# Patient Record
Sex: FEMALE | Race: BLACK OR AFRICAN AMERICAN | Employment: OTHER | URBAN - METROPOLITAN AREA
[De-identification: names, ages, dates, MRNs, and addresses within clinical notes are randomized per-mention and may not be internally consistent; named-entity substitution may affect disease eponyms.]

---

## 2019-09-25 ENCOUNTER — HOSPITAL ENCOUNTER (EMERGENCY)
Age: 64
Discharge: HOME | End: 2019-09-25
Attending: EMERGENCY MEDICINE
Payer: MEDICAID

## 2019-09-25 VITALS
RESPIRATION RATE: 18 BRPM | HEIGHT: 63 IN | TEMPERATURE: 97.9 F | DIASTOLIC BLOOD PRESSURE: 72 MMHG | WEIGHT: 180 LBS | BODY MASS INDEX: 31.89 KG/M2 | HEART RATE: 75 BPM | OXYGEN SATURATION: 100 % | SYSTOLIC BLOOD PRESSURE: 135 MMHG

## 2019-09-25 DIAGNOSIS — R10.33 PERIUMBILICAL ABDOMINAL PAIN: Primary | ICD-10-CM

## 2019-09-25 LAB
ABO/RH: NORMAL
ALBUMIN SERPL-MCNC: 3.7 G/DL (ref 3.4–5)
ALP BLD-CCNC: 80 U/L (ref 40–129)
ALT SERPL-CCNC: 24 U/L (ref 10–40)
ANION GAP SERPL CALCULATED.3IONS-SCNC: 7 MMOL/L (ref 3–16)
ANTIBODY SCREEN: NORMAL
AST SERPL-CCNC: 34 U/L (ref 15–37)
BASOPHILS ABSOLUTE: 0 K/UL (ref 0–0.2)
BASOPHILS RELATIVE PERCENT: 0.5 %
BILIRUB SERPL-MCNC: 0.4 MG/DL (ref 0–1)
BILIRUBIN DIRECT: <0.2 MG/DL (ref 0–0.3)
BILIRUBIN, INDIRECT: NORMAL MG/DL (ref 0–1)
BUN BLDV-MCNC: 9 MG/DL (ref 7–20)
CALCIUM SERPL-MCNC: 9.1 MG/DL (ref 8.3–10.6)
CHLORIDE BLD-SCNC: 104 MMOL/L (ref 99–110)
CO2: 31 MMOL/L (ref 21–32)
CREAT SERPL-MCNC: 0.8 MG/DL (ref 0.6–1.2)
EKG ATRIAL RATE: 74 BPM
EKG DIAGNOSIS: NORMAL
EKG P AXIS: 58 DEGREES
EKG P-R INTERVAL: 170 MS
EKG Q-T INTERVAL: 380 MS
EKG QRS DURATION: 84 MS
EKG QTC CALCULATION (BAZETT): 421 MS
EKG R AXIS: 24 DEGREES
EKG T AXIS: 42 DEGREES
EKG VENTRICULAR RATE: 74 BPM
EOSINOPHILS ABSOLUTE: 0.1 K/UL (ref 0–0.6)
EOSINOPHILS RELATIVE PERCENT: 2.2 %
GFR AFRICAN AMERICAN: >60
GFR NON-AFRICAN AMERICAN: >60
GLUCOSE BLD-MCNC: 169 MG/DL (ref 70–99)
HCT VFR BLD CALC: 40.7 % (ref 36–48)
HEMOGLOBIN: 13.8 G/DL (ref 12–16)
LIPASE: 21 U/L (ref 13–60)
LYMPHOCYTES ABSOLUTE: 1.9 K/UL (ref 1–5.1)
LYMPHOCYTES RELATIVE PERCENT: 34.3 %
MCH RBC QN AUTO: 31.7 PG (ref 26–34)
MCHC RBC AUTO-ENTMCNC: 33.8 G/DL (ref 31–36)
MCV RBC AUTO: 93.6 FL (ref 80–100)
MONOCYTES ABSOLUTE: 0.3 K/UL (ref 0–1.3)
MONOCYTES RELATIVE PERCENT: 5.8 %
NEUTROPHILS ABSOLUTE: 3.2 K/UL (ref 1.7–7.7)
NEUTROPHILS RELATIVE PERCENT: 57.2 %
PDW BLD-RTO: 13 % (ref 12.4–15.4)
PLATELET # BLD: 154 K/UL (ref 135–450)
PMV BLD AUTO: 8.7 FL (ref 5–10.5)
POC OCCULT BLOOD STOOL: NEGATIVE
POTASSIUM SERPL-SCNC: 3.6 MMOL/L (ref 3.5–5.1)
PRO-BNP: 66 PG/ML (ref 0–124)
RBC # BLD: 4.35 M/UL (ref 4–5.2)
SODIUM BLD-SCNC: 142 MMOL/L (ref 136–145)
TOTAL PROTEIN: 7.2 G/DL (ref 6.4–8.2)
TROPONIN: <0.01 NG/ML
WBC # BLD: 5.7 K/UL (ref 4–11)

## 2019-09-25 PROCEDURE — 84484 ASSAY OF TROPONIN QUANT: CPT

## 2019-09-25 PROCEDURE — 85025 COMPLETE CBC W/AUTO DIFF WBC: CPT

## 2019-09-25 PROCEDURE — 36415 COLL VENOUS BLD VENIPUNCTURE: CPT

## 2019-09-25 PROCEDURE — 86901 BLOOD TYPING SEROLOGIC RH(D): CPT

## 2019-09-25 PROCEDURE — 99284 EMERGENCY DEPT VISIT MOD MDM: CPT

## 2019-09-25 PROCEDURE — 80076 HEPATIC FUNCTION PANEL: CPT

## 2019-09-25 PROCEDURE — 83880 ASSAY OF NATRIURETIC PEPTIDE: CPT

## 2019-09-25 PROCEDURE — 80048 BASIC METABOLIC PNL TOTAL CA: CPT

## 2019-09-25 PROCEDURE — 86900 BLOOD TYPING SEROLOGIC ABO: CPT

## 2019-09-25 PROCEDURE — 86850 RBC ANTIBODY SCREEN: CPT

## 2019-09-25 PROCEDURE — 83690 ASSAY OF LIPASE: CPT

## 2019-09-25 PROCEDURE — 82272 OCCULT BLD FECES 1-3 TESTS: CPT

## 2019-09-25 PROCEDURE — 93005 ELECTROCARDIOGRAM TRACING: CPT | Performed by: PHYSICIAN ASSISTANT

## 2019-09-25 RX ORDER — OLANZAPINE 10 MG/1
10 TABLET ORAL NIGHTLY
COMMUNITY

## 2019-09-25 ASSESSMENT — PAIN SCALES - GENERAL: PAINLEVEL_OUTOF10: 0

## 2019-09-25 ASSESSMENT — ENCOUNTER SYMPTOMS
ABDOMINAL PAIN: 1
VOMITING: 0
SHORTNESS OF BREATH: 0
NAUSEA: 1
CONSTIPATION: 0
DIARRHEA: 0
CHEST TIGHTNESS: 0

## 2019-10-11 ENCOUNTER — HOSPITAL ENCOUNTER (OUTPATIENT)
Dept: MAMMOGRAPHY | Age: 64
Discharge: HOME | End: 2019-10-16
Payer: MEDICAID

## 2019-10-11 DIAGNOSIS — Z12.31 VISIT FOR SCREENING MAMMOGRAM: ICD-10-CM

## 2019-10-11 PROCEDURE — 77067 SCR MAMMO BI INCL CAD: CPT

## 2021-05-17 ENCOUNTER — HOSPITAL ENCOUNTER (OUTPATIENT)
Dept: MAMMOGRAPHY | Age: 66
Discharge: HOME | End: 2021-05-22
Payer: MEDICAID

## 2021-05-17 VITALS — WEIGHT: 182 LBS | HEIGHT: 63 IN | BODY MASS INDEX: 32.25 KG/M2

## 2021-05-17 DIAGNOSIS — Z12.31 VISIT FOR SCREENING MAMMOGRAM: ICD-10-CM

## 2021-05-17 PROCEDURE — 77067 SCR MAMMO BI INCL CAD: CPT

## 2022-10-10 ENCOUNTER — HOSPITAL ENCOUNTER (EMERGENCY)
Age: 67
Discharge: HOME | End: 2022-10-10
Attending: EMERGENCY MEDICINE
Payer: MEDICARE

## 2022-10-10 ENCOUNTER — APPOINTMENT (OUTPATIENT)
Dept: GENERAL RADIOLOGY | Age: 67
End: 2022-10-10
Payer: MEDICARE

## 2022-10-10 VITALS
HEART RATE: 75 BPM | DIASTOLIC BLOOD PRESSURE: 98 MMHG | BODY MASS INDEX: 31.01 KG/M2 | OXYGEN SATURATION: 100 % | RESPIRATION RATE: 12 BRPM | TEMPERATURE: 99 F | HEIGHT: 63 IN | SYSTOLIC BLOOD PRESSURE: 160 MMHG | WEIGHT: 175 LBS

## 2022-10-10 DIAGNOSIS — S99.921A INJURY OF TOE ON RIGHT FOOT, INITIAL ENCOUNTER: Primary | ICD-10-CM

## 2022-10-10 PROCEDURE — 90471 IMMUNIZATION ADMIN: CPT | Performed by: EMERGENCY MEDICINE

## 2022-10-10 PROCEDURE — 99284 EMERGENCY DEPT VISIT MOD MDM: CPT

## 2022-10-10 PROCEDURE — 73630 X-RAY EXAM OF FOOT: CPT

## 2022-10-10 PROCEDURE — 96372 THER/PROPH/DIAG INJ SC/IM: CPT

## 2022-10-10 PROCEDURE — 6370000000 HC RX 637 (ALT 250 FOR IP): Performed by: EMERGENCY MEDICINE

## 2022-10-10 PROCEDURE — 90715 TDAP VACCINE 7 YRS/> IM: CPT | Performed by: EMERGENCY MEDICINE

## 2022-10-10 PROCEDURE — 6360000002 HC RX W HCPCS: Performed by: EMERGENCY MEDICINE

## 2022-10-10 RX ORDER — ACETAMINOPHEN 325 MG/1
650 TABLET ORAL ONCE
Status: COMPLETED | OUTPATIENT
Start: 2022-10-10 | End: 2022-10-10

## 2022-10-10 RX ORDER — IBUPROFEN 600 MG/1
600 TABLET ORAL 4 TIMES DAILY PRN
Qty: 40 TABLET | Refills: 0 | Status: ON HOLD | OUTPATIENT
Start: 2022-10-10

## 2022-10-10 RX ORDER — ACETAMINOPHEN 500 MG
500 TABLET ORAL 4 TIMES DAILY PRN
Qty: 360 TABLET | Refills: 1 | Status: ON HOLD | OUTPATIENT
Start: 2022-10-10

## 2022-10-10 RX ORDER — KETOROLAC TROMETHAMINE 30 MG/ML
15 INJECTION, SOLUTION INTRAMUSCULAR; INTRAVENOUS ONCE
Status: COMPLETED | OUTPATIENT
Start: 2022-10-10 | End: 2022-10-10

## 2022-10-10 RX ADMIN — ACETAMINOPHEN 650 MG: 325 TABLET ORAL at 08:17

## 2022-10-10 RX ADMIN — KETOROLAC TROMETHAMINE 15 MG: 30 INJECTION, SOLUTION INTRAMUSCULAR at 08:19

## 2022-10-10 RX ADMIN — TETANUS TOXOID, REDUCED DIPHTHERIA TOXOID AND ACELLULAR PERTUSSIS VACCINE, ADSORBED 0.5 ML: 5; 2.5; 8; 8; 2.5 SUSPENSION INTRAMUSCULAR at 08:18

## 2022-10-10 ASSESSMENT — PAIN SCALES - GENERAL
PAINLEVEL_OUTOF10: 2
PAINLEVEL_OUTOF10: 4

## 2022-10-10 ASSESSMENT — PAIN DESCRIPTION - LOCATION
LOCATION: TOE (COMMENT WHICH ONE)
LOCATION: TOE (COMMENT WHICH ONE)

## 2022-10-10 ASSESSMENT — PAIN DESCRIPTION - ORIENTATION
ORIENTATION: LEFT
ORIENTATION: RIGHT

## 2022-10-10 ASSESSMENT — PAIN DESCRIPTION - DESCRIPTORS: DESCRIPTORS: THROBBING

## 2022-10-10 ASSESSMENT — PAIN - FUNCTIONAL ASSESSMENT: PAIN_FUNCTIONAL_ASSESSMENT: 0-10

## 2022-10-10 NOTE — ED PROVIDER NOTES
1 [facility]  EMERGENCY DEPARTMENT ENCOUNTER          ATTENDING PHYSICIAN NOTE       Date of evaluation: 10/10/2022    Chief Complaint     Fall      History of Present Illness     Gilberto Braga is a 78 y.o. female who presents to the emergency department for right great toe pain. Patient says that she was surprised when her daughter came home this morning at 5 AM, knocked on the door, she stood up and started walking towards the door when she caught the toe on the floor and bent it underneath her foot as she was walking. She heard a snap and has had pain since then. She is able to bear weight and ambulate without the need of assistance. She denies falling, additional injuries, pain other than in the toe itself, and all other complaints. Review of Systems     Review of Systems    Pertinent positive and negative findings as documented in the HPI, otherwise other systems were reviewed and were negative. Past Medical, Surgical, Family, and Social History     She has a past medical history of Diabetes mellitus ([facility] [address] Hypertension, and Stroke ([facility] [address] She has no past surgical history on file. Her family history is not on file. She reports that she has been smoking cigarettes. She has a 20.00 pack-year smoking history. She has never used smokeless tobacco. She reports current alcohol use. She reports that she does not use drugs. Medications     Previous Medications    DIPHENHYDRAMINE (SOMINEX) 25 MG TABLET    Take 25 mg by mouth nightly as needed for Sleep. METFORMIN (GLUCOPHAGE) 500 MG TABLET    Take 500 mg by mouth 2 times daily (with meals). NAPROXEN SODIUM (ANAPROX DS) 550 MG TABLET    Take 1 tablet by mouth 2 times daily (with meals) for 14 doses. OLANZAPINE (ZYPREXA) 10 MG TABLET    Take 10 mg by mouth nightly       Allergies     She is allergic to codeine.     Physical Exam     INITIAL VITALS: BP: (!) 164/94, Temp: 99 °F (37.2 °C), Heart Rate: 78, Resp: 18, SpO2: 100 %   Physical Exam    General: Well developed and well nourished. No acute distress. HEENT: NCAT, PERRL, moist mucous membranes  Neck: Trachea midline, neck supple with FROM  MSK: Tenderness at the proximal first phalanx of the right foot, no deformity, no swelling. Superficial laceration at the base of the nail, however toenail remains intact with no obvious avulsion. No tenderness along the metatarsals, ankle, or knee along the right lower extremity. Extremities: No cyanosis or edema. Peripheral pulses intact. Skin: No rashes, abrasions, contusions, or lacerations noted. Neuro: Alert and oriented, moves all extremities spontaneously. No gross motor or sensory deficits. Psych: Mood and affect appropriate. Thought process and content normal.    Diagnostic Results     EKG   None    RADIOLOGY:  XR FOOT RIGHT (MIN 3 VIEWS)   Final Result   1. No acute fracture or dislocation. LABS:   No results found for this visit on 10/10/22. ED BEDSIDE ULTRASOUND:  No results found. RECENT VITALS:  BP: 136/87,Temp: 99 °F (37.2 °C), Heart Rate: 80, Resp: 18, SpO2: 99 %     Procedures     None    ED Course     Nursing Notes, Past Medical Hx, Past Surgical Hx, Social Hx,Allergies, and Family Hx were reviewed.     ED Course as of 10/10/22 0940   Mon Oct 10, 2022   0811 XR FOOT RIGHT (MIN 3 VIEWS) [JH]      ED Course User Index  [JH] Umer Cruz MD       patient was given the following medications:  Orders Placed This Encounter   Medications    Tetanus-Diphth-Acell Pertussis (BOOSTRIX) injection 0.5 mL    ketorolac (TORADOL) injection 15 mg    acetaminophen (TYLENOL) tablet 650 mg    acetaminophen (TYLENOL) 500 MG tablet     Sig: Take 1 tablet by mouth 4 times daily as needed for Pain     Dispense:  360 tablet     Refill:  1    ibuprofen (ADVIL;MOTRIN) 600 MG tablet     Sig: Take 1 tablet by mouth 4 times daily as needed for Pain     Dispense:  40 tablet     Refill:  0       CONSULTS:  None    MEDICAL DECISIONMAKING / ASSESSMENT / SOWMYA Palma is a 78 y.o. female presenting with right great toe pain. On presentation patient was afebrile, hemodynamically stable, no acute distress. Clinically there was minimal concern for a fracture or dislocation as well as minimal concern for a ligamentous injury. Plain films confirmed no osseous injuries. Wound was cleaned and dressed and the patient's tetanus was updated. She was given instructions on wound care and over-the-counter pain control as well as prescriptions for ibuprofen and acetaminophen at her request.    Prior to discharge the patient had some additional questions about medications that she had been prescribed at previous visits where she had been seen for shortness of breath. Specifically wondered if she should continue taking them, but upon serial questioning with questions phrased differently each time she specifically denied the presence of chest pain or dyspnea this morning. Therefore no additional work-up was felt to be indicated at this time. I advised her to continue taking all prescribed medications as directed and to follow-up with her primary care provider. Patient verbalized understanding agreement and was ultimately discharged in stable condition with return precautions. Clinical Impression     1.  Injury of toe on right foot, initial encounter        Disposition     PATIENT REFERRED TO:  Debbie Fagan MD  [address] [place] [address]    Schedule an appointment as soon as possible for a visit   As needed    DISCHARGE MEDICATIONS:  New Prescriptions    ACETAMINOPHEN (TYLENOL) 500 MG TABLET    Take 1 tablet by mouth 4 times daily as needed for Pain    IBUPROFEN (ADVIL;MOTRIN) 600 MG TABLET    Take 1 tablet by mouth 4 times daily as needed for Pain       DISPOSITION Decision To Discharge 10/10/2022 09:34:16 AM          Shonda Wolf MD  10/10/22 8099

## 2022-10-10 NOTE — DISCHARGE INSTRUCTIONS
Your x rays did not show any fracture or dislocation. Your pain is likely musculoskeletal in nature at this time. You should rest the affected area as much as possible, apply ice to the affected area, and take anti-inflammatory medications like Advil or Motrin for your pain. Return to the ED for worsening pain, swelling or redness, or other significant concerns. You may alternate Tylenol and Ibuprofen for pain coverage. Take Ibuprofen 600 mg every 6 hours for your pain. Ibuprofen: Take one 600 mg tablet every 6 hours as needed for pain and inflammation. Take this medicine with food, and be sure to drink plenty of water. Do not take this medicine continuously for longer than two weeks. Ibuprofen is a part of a class of medications called nonsteroidal anti-inflammatory drugs (NSAIDs). NSAIDs cause an increased risk of serious gastrointestinal (GI) adverse events, including bleeding, ulceration, and perforation of the stomach or intestines, which can be fatal. These events can occur at any time during use and without warning symptoms. Elderly patients and patients with a prior history of peptic ulcer disease and/or GI bleeding are at greater risk for serious GI events. Nonsteroidal anti-inflammatory drugs (NSAIDs) cause an increased risk of serious cardiovascular events, including heart attack and stroke, which can be fatal. This risk may occur early in treatment and may increase with duration of use. Use with caution. 3 hours after taking the Ibuprofen, alternate with Tylenol 500 mg. Tylenol 500mg   Take 1 tablet by mouth every 8 hours as needed for pain. Tylenol is highly effective when combined with an anti-inflammatory (NSAID) drug such as: ibuprofen (Advil, Aleve, Motrin, Naproxen). If you were prescribed both medications, take them together. They work better when taken together vs one or the other. Tylenol is in other pain medications, such as Vicodin and Percocet.  Do not take more than 3,000mg (6 tablets) of Tylenol in one day. Do not consume more than 3 alcoholic beverages while taking Tylenol. Do not take Tylenol if you have liver disease.

## 2022-10-10 NOTE — ED TRIAGE NOTES
Pt states that she stubbled today and bent her toe back and may have broken her big toe on the R foot.

## 2022-11-03 ENCOUNTER — HOSPITAL ENCOUNTER (INPATIENT)
Age: 67
LOS: 2 days | Discharge: HOME | DRG: 378 | End: 2022-11-06
Attending: EMERGENCY MEDICINE | Admitting: INTERNAL MEDICINE
Payer: MEDICARE

## 2022-11-03 DIAGNOSIS — K92.2 LOWER GI BLEED: Primary | ICD-10-CM

## 2022-11-03 DIAGNOSIS — K92.2 GASTROINTESTINAL HEMORRHAGE, UNSPECIFIED GASTROINTESTINAL HEMORRHAGE TYPE: ICD-10-CM

## 2022-11-03 PROCEDURE — 85025 COMPLETE CBC W/AUTO DIFF WBC: CPT

## 2022-11-03 PROCEDURE — 36415 COLL VENOUS BLD VENIPUNCTURE: CPT

## 2022-11-03 PROCEDURE — 86923 COMPATIBILITY TEST ELECTRIC: CPT

## 2022-11-03 PROCEDURE — 85730 THROMBOPLASTIN TIME PARTIAL: CPT

## 2022-11-03 PROCEDURE — 99285 EMERGENCY DEPT VISIT HI MDM: CPT

## 2022-11-03 PROCEDURE — 86901 BLOOD TYPING SEROLOGIC RH(D): CPT

## 2022-11-03 PROCEDURE — 86900 BLOOD TYPING SEROLOGIC ABO: CPT

## 2022-11-03 PROCEDURE — 86850 RBC ANTIBODY SCREEN: CPT

## 2022-11-03 PROCEDURE — P9016 RBC LEUKOCYTES REDUCED: HCPCS

## 2022-11-03 PROCEDURE — 86920 COMPATIBILITY TEST SPIN: CPT

## 2022-11-03 PROCEDURE — 80048 BASIC METABOLIC PNL TOTAL CA: CPT

## 2022-11-03 PROCEDURE — 85610 PROTHROMBIN TIME: CPT

## 2022-11-03 RX ORDER — TRANEXAMIC ACID 100 MG/ML
1000 INJECTION, SOLUTION INTRAVENOUS ONCE
Status: DISCONTINUED | OUTPATIENT
Start: 2022-11-03 | End: 2022-11-03

## 2022-11-03 RX ORDER — PANTOPRAZOLE SODIUM 40 MG/10ML
40 INJECTION, POWDER, LYOPHILIZED, FOR SOLUTION INTRAVENOUS ONCE
Status: COMPLETED | OUTPATIENT
Start: 2022-11-03 | End: 2022-11-04

## 2022-11-03 ASSESSMENT — PAIN DESCRIPTION - LOCATION: LOCATION: ABDOMEN

## 2022-11-03 ASSESSMENT — PAIN - FUNCTIONAL ASSESSMENT: PAIN_FUNCTIONAL_ASSESSMENT: 0-10

## 2022-11-04 ENCOUNTER — APPOINTMENT (OUTPATIENT)
Dept: ULTRASOUND IMAGING | Age: 67
DRG: 378 | End: 2022-11-04
Payer: MEDICARE

## 2022-11-04 ENCOUNTER — APPOINTMENT (OUTPATIENT)
Dept: CT IMAGING | Age: 67
DRG: 378 | End: 2022-11-04
Payer: MEDICARE

## 2022-11-04 PROBLEM — K92.2 ACUTE GI BLEEDING: Status: ACTIVE | Noted: 2022-11-04

## 2022-11-04 LAB
ABO/RH: NORMAL
ALBUMIN SERPL-MCNC: 3.6 G/DL (ref 3.4–5)
ALP BLD-CCNC: 65 U/L (ref 40–129)
ALT SERPL-CCNC: 27 U/L (ref 10–40)
ANION GAP SERPL CALCULATED.3IONS-SCNC: 7 MMOL/L (ref 3–16)
ANION GAP SERPL CALCULATED.3IONS-SCNC: 8 MMOL/L (ref 3–16)
ANTIBODY SCREEN: NORMAL
APTT: 26.6 SEC (ref 23–34.3)
AST SERPL-CCNC: 43 U/L (ref 15–37)
BASOPHILS ABSOLUTE: 0 K/UL (ref 0–0.2)
BASOPHILS ABSOLUTE: 0.1 K/UL (ref 0–0.2)
BASOPHILS RELATIVE PERCENT: 0.4 %
BASOPHILS RELATIVE PERCENT: 0.7 %
BILIRUB SERPL-MCNC: 0.5 MG/DL (ref 0–1)
BILIRUBIN DIRECT: <0.2 MG/DL (ref 0–0.3)
BILIRUBIN, INDIRECT: ABNORMAL MG/DL (ref 0–1)
BLOOD BANK DISPENSE STATUS: NORMAL
BLOOD BANK DISPENSE STATUS: NORMAL
BLOOD BANK PRODUCT CODE: NORMAL
BLOOD BANK PRODUCT CODE: NORMAL
BPU ID: NORMAL
BPU ID: NORMAL
BUN BLDV-MCNC: 23 MG/DL (ref 7–20)
BUN BLDV-MCNC: 25 MG/DL (ref 7–20)
CALCIUM SERPL-MCNC: 8.8 MG/DL (ref 8.3–10.6)
CALCIUM SERPL-MCNC: 9.1 MG/DL (ref 8.3–10.6)
CHLORIDE BLD-SCNC: 103 MMOL/L (ref 99–110)
CHLORIDE BLD-SCNC: 105 MMOL/L (ref 99–110)
CO2: 26 MMOL/L (ref 21–32)
CO2: 27 MMOL/L (ref 21–32)
CREAT SERPL-MCNC: 0.7 MG/DL (ref 0.6–1.2)
CREAT SERPL-MCNC: 0.8 MG/DL (ref 0.6–1.2)
DESCRIPTION BLOOD BANK: NORMAL
DESCRIPTION BLOOD BANK: NORMAL
EOSINOPHILS ABSOLUTE: 0.1 K/UL (ref 0–0.6)
EOSINOPHILS ABSOLUTE: 0.2 K/UL (ref 0–0.6)
EOSINOPHILS RELATIVE PERCENT: 0.7 %
EOSINOPHILS RELATIVE PERCENT: 1.8 %
GFR SERPL CREATININE-BSD FRML MDRD: >60 ML/MIN/{1.73_M2}
GFR SERPL CREATININE-BSD FRML MDRD: >60 ML/MIN/{1.73_M2}
GLUCOSE BLD-MCNC: 112 MG/DL (ref 70–99)
GLUCOSE BLD-MCNC: 113 MG/DL (ref 70–99)
GLUCOSE BLD-MCNC: 116 MG/DL (ref 70–99)
GLUCOSE BLD-MCNC: 136 MG/DL (ref 70–99)
GLUCOSE BLD-MCNC: 137 MG/DL (ref 70–99)
GLUCOSE BLD-MCNC: 208 MG/DL (ref 70–99)
HCT VFR BLD CALC: 30.2 % (ref 36–48)
HCT VFR BLD CALC: 33.9 % (ref 36–48)
HCT VFR BLD CALC: 35.2 % (ref 36–48)
HCT VFR BLD CALC: 35.4 % (ref 36–48)
HEMOGLOBIN: 10.3 G/DL (ref 12–16)
HEMOGLOBIN: 11.5 G/DL (ref 12–16)
HEMOGLOBIN: 12 G/DL (ref 12–16)
HEMOGLOBIN: 12 G/DL (ref 12–16)
INR BLD: 1.07 (ref 0.87–1.14)
LYMPHOCYTES ABSOLUTE: 2.7 K/UL (ref 1–5.1)
LYMPHOCYTES ABSOLUTE: 4 K/UL (ref 1–5.1)
LYMPHOCYTES RELATIVE PERCENT: 21.9 %
LYMPHOCYTES RELATIVE PERCENT: 38.2 %
MCH RBC QN AUTO: 30.8 PG (ref 26–34)
MCH RBC QN AUTO: 31.6 PG (ref 26–34)
MCHC RBC AUTO-ENTMCNC: 33.9 G/DL (ref 31–36)
MCHC RBC AUTO-ENTMCNC: 33.9 G/DL (ref 31–36)
MCV RBC AUTO: 90.8 FL (ref 80–100)
MCV RBC AUTO: 93 FL (ref 80–100)
MONOCYTES ABSOLUTE: 0.5 K/UL (ref 0–1.3)
MONOCYTES ABSOLUTE: 0.6 K/UL (ref 0–1.3)
MONOCYTES RELATIVE PERCENT: 4.5 %
MONOCYTES RELATIVE PERCENT: 5.1 %
NEUTROPHILS ABSOLUTE: 5.7 K/UL (ref 1.7–7.7)
NEUTROPHILS ABSOLUTE: 8.9 K/UL (ref 1.7–7.7)
NEUTROPHILS RELATIVE PERCENT: 54.5 %
NEUTROPHILS RELATIVE PERCENT: 72.2 %
PDW BLD-RTO: 12.9 % (ref 12.4–15.4)
PDW BLD-RTO: 14.8 % (ref 12.4–15.4)
PERFORMED ON: ABNORMAL
PLATELET # BLD: 185 K/UL (ref 135–450)
PLATELET # BLD: 194 K/UL (ref 135–450)
PMV BLD AUTO: 7.9 FL (ref 5–10.5)
PMV BLD AUTO: 8.5 FL (ref 5–10.5)
POTASSIUM REFLEX MAGNESIUM: 4 MMOL/L (ref 3.5–5.1)
POTASSIUM REFLEX MAGNESIUM: 4.3 MMOL/L (ref 3.5–5.1)
PROTHROMBIN TIME: 13.8 SEC (ref 11.7–14.5)
RBC # BLD: 3.64 M/UL (ref 4–5.2)
RBC # BLD: 3.9 M/UL (ref 4–5.2)
SODIUM BLD-SCNC: 137 MMOL/L (ref 136–145)
SODIUM BLD-SCNC: 139 MMOL/L (ref 136–145)
TOTAL PROTEIN: 6.2 G/DL (ref 6.4–8.2)
WBC # BLD: 10.5 K/UL (ref 4–11)
WBC # BLD: 12.3 K/UL (ref 4–11)

## 2022-11-04 PROCEDURE — C9113 INJ PANTOPRAZOLE SODIUM, VIA: HCPCS | Performed by: EMERGENCY MEDICINE

## 2022-11-04 PROCEDURE — 6370000000 HC RX 637 (ALT 250 FOR IP): Performed by: STUDENT IN AN ORGANIZED HEALTH CARE EDUCATION/TRAINING PROGRAM

## 2022-11-04 PROCEDURE — 36415 COLL VENOUS BLD VENIPUNCTURE: CPT

## 2022-11-04 PROCEDURE — 87522 HEPATITIS C REVRS TRNSCRPJ: CPT

## 2022-11-04 PROCEDURE — 6360000004 HC RX CONTRAST MEDICATION: Performed by: EMERGENCY MEDICINE

## 2022-11-04 PROCEDURE — 85014 HEMATOCRIT: CPT

## 2022-11-04 PROCEDURE — 96374 THER/PROPH/DIAG INJ IV PUSH: CPT

## 2022-11-04 PROCEDURE — 6360000002 HC RX W HCPCS: Performed by: STUDENT IN AN ORGANIZED HEALTH CARE EDUCATION/TRAINING PROGRAM

## 2022-11-04 PROCEDURE — 80076 HEPATIC FUNCTION PANEL: CPT

## 2022-11-04 PROCEDURE — C9113 INJ PANTOPRAZOLE SODIUM, VIA: HCPCS | Performed by: STUDENT IN AN ORGANIZED HEALTH CARE EDUCATION/TRAINING PROGRAM

## 2022-11-04 PROCEDURE — 85025 COMPLETE CBC W/AUTO DIFF WBC: CPT

## 2022-11-04 PROCEDURE — 2060000000 HC ICU INTERMEDIATE R&B

## 2022-11-04 PROCEDURE — 6360000002 HC RX W HCPCS: Performed by: EMERGENCY MEDICINE

## 2022-11-04 PROCEDURE — 2580000003 HC RX 258: Performed by: STUDENT IN AN ORGANIZED HEALTH CARE EDUCATION/TRAINING PROGRAM

## 2022-11-04 PROCEDURE — 85018 HEMOGLOBIN: CPT

## 2022-11-04 PROCEDURE — 76705 ECHO EXAM OF ABDOMEN: CPT

## 2022-11-04 PROCEDURE — 6370000000 HC RX 637 (ALT 250 FOR IP): Performed by: INTERNAL MEDICINE

## 2022-11-04 PROCEDURE — 80048 BASIC METABOLIC PNL TOTAL CA: CPT

## 2022-11-04 PROCEDURE — 74174 CTA ABD&PLVS W/CONTRAST: CPT

## 2022-11-04 RX ORDER — SODIUM CHLORIDE 0.9 % (FLUSH) 0.9 %
5-40 SYRINGE (ML) INJECTION EVERY 12 HOURS SCHEDULED
Status: DISCONTINUED | OUTPATIENT
Start: 2022-11-04 | End: 2022-11-06 | Stop reason: HOSPADM

## 2022-11-04 RX ORDER — ACETAMINOPHEN 650 MG/1
650 SUPPOSITORY RECTAL EVERY 6 HOURS PRN
Status: DISCONTINUED | OUTPATIENT
Start: 2022-11-04 | End: 2022-11-06 | Stop reason: HOSPADM

## 2022-11-04 RX ORDER — SODIUM CHLORIDE 9 MG/ML
INJECTION, SOLUTION INTRAVENOUS PRN
Status: DISCONTINUED | OUTPATIENT
Start: 2022-11-04 | End: 2022-11-06 | Stop reason: HOSPADM

## 2022-11-04 RX ORDER — DEXTROSE MONOHYDRATE 100 MG/ML
INJECTION, SOLUTION INTRAVENOUS CONTINUOUS PRN
Status: DISCONTINUED | OUTPATIENT
Start: 2022-11-04 | End: 2022-11-06 | Stop reason: HOSPADM

## 2022-11-04 RX ORDER — SODIUM CHLORIDE 9 MG/ML
INJECTION, SOLUTION INTRAVENOUS CONTINUOUS
Status: ACTIVE | OUTPATIENT
Start: 2022-11-04 | End: 2022-11-05

## 2022-11-04 RX ORDER — SODIUM CHLORIDE 9 MG/ML
INJECTION, SOLUTION INTRAVENOUS CONTINUOUS
Status: DISCONTINUED | OUTPATIENT
Start: 2022-11-04 | End: 2022-11-04

## 2022-11-04 RX ORDER — ACETAMINOPHEN 325 MG/1
650 TABLET ORAL EVERY 6 HOURS PRN
Status: DISCONTINUED | OUTPATIENT
Start: 2022-11-04 | End: 2022-11-06 | Stop reason: HOSPADM

## 2022-11-04 RX ORDER — SODIUM CHLORIDE 0.9 % (FLUSH) 0.9 %
5-40 SYRINGE (ML) INJECTION PRN
Status: DISCONTINUED | OUTPATIENT
Start: 2022-11-04 | End: 2022-11-06 | Stop reason: HOSPADM

## 2022-11-04 RX ORDER — POLYETHYLENE GLYCOL 3350 17 G/17G
17 POWDER, FOR SOLUTION ORAL DAILY PRN
Status: DISCONTINUED | OUTPATIENT
Start: 2022-11-04 | End: 2022-11-06 | Stop reason: HOSPADM

## 2022-11-04 RX ORDER — INSULIN LISPRO 100 [IU]/ML
0-4 INJECTION, SOLUTION INTRAVENOUS; SUBCUTANEOUS NIGHTLY
Status: DISCONTINUED | OUTPATIENT
Start: 2022-11-04 | End: 2022-11-06 | Stop reason: HOSPADM

## 2022-11-04 RX ORDER — ONDANSETRON 2 MG/ML
4 INJECTION INTRAMUSCULAR; INTRAVENOUS EVERY 6 HOURS PRN
Status: DISCONTINUED | OUTPATIENT
Start: 2022-11-04 | End: 2022-11-06 | Stop reason: HOSPADM

## 2022-11-04 RX ORDER — PANTOPRAZOLE SODIUM 40 MG/10ML
40 INJECTION, POWDER, LYOPHILIZED, FOR SOLUTION INTRAVENOUS 2 TIMES DAILY
Status: DISCONTINUED | OUTPATIENT
Start: 2022-11-04 | End: 2022-11-06 | Stop reason: HOSPADM

## 2022-11-04 RX ORDER — OLANZAPINE 5 MG/1
10 TABLET ORAL NIGHTLY
Status: DISCONTINUED | OUTPATIENT
Start: 2022-11-04 | End: 2022-11-06 | Stop reason: HOSPADM

## 2022-11-04 RX ORDER — ONDANSETRON 4 MG/1
4 TABLET, ORALLY DISINTEGRATING ORAL EVERY 8 HOURS PRN
Status: DISCONTINUED | OUTPATIENT
Start: 2022-11-04 | End: 2022-11-06 | Stop reason: HOSPADM

## 2022-11-04 RX ORDER — INSULIN LISPRO 100 [IU]/ML
0-4 INJECTION, SOLUTION INTRAVENOUS; SUBCUTANEOUS
Status: DISCONTINUED | OUTPATIENT
Start: 2022-11-04 | End: 2022-11-06 | Stop reason: HOSPADM

## 2022-11-04 RX ADMIN — PANTOPRAZOLE SODIUM 40 MG: 40 INJECTION, POWDER, LYOPHILIZED, FOR SOLUTION INTRAVENOUS at 06:32

## 2022-11-04 RX ADMIN — PANTOPRAZOLE SODIUM 40 MG: 40 INJECTION, POWDER, LYOPHILIZED, FOR SOLUTION INTRAVENOUS at 20:41

## 2022-11-04 RX ADMIN — SODIUM CHLORIDE, PRESERVATIVE FREE 10 ML: 5 INJECTION INTRAVENOUS at 20:41

## 2022-11-04 RX ADMIN — OLANZAPINE 10 MG: 5 TABLET, FILM COATED ORAL at 20:40

## 2022-11-04 RX ADMIN — BISACODYL 20 MG: 5 TABLET, COATED ORAL at 15:40

## 2022-11-04 RX ADMIN — SODIUM CHLORIDE: 9 INJECTION, SOLUTION INTRAVENOUS at 04:17

## 2022-11-04 RX ADMIN — SODIUM CHLORIDE, PRESERVATIVE FREE 10 ML: 5 INJECTION INTRAVENOUS at 08:48

## 2022-11-04 RX ADMIN — PANTOPRAZOLE SODIUM 40 MG: 40 INJECTION, POWDER, FOR SOLUTION INTRAVENOUS at 00:39

## 2022-11-04 RX ADMIN — POLYETHYLENE GLYCOL-3350 AND ELECTROLYTES 4000 ML: 236; 6.74; 5.86; 2.97; 22.74 POWDER, FOR SOLUTION ORAL at 15:42

## 2022-11-04 RX ADMIN — IOPAMIDOL 75 ML: 755 INJECTION, SOLUTION INTRAVENOUS at 00:50

## 2022-11-04 ASSESSMENT — PAIN SCALES - GENERAL
PAINLEVEL_OUTOF10: 0

## 2022-11-04 ASSESSMENT — PAIN SCALES - WONG BAKER: WONGBAKER_NUMERICALRESPONSE: 0

## 2022-11-04 NOTE — PROGRESS NOTES
4 Eyes Admission Assessment     I agree as the admission nurse that 2 RN's have performed a thorough Head to Toe Skin Assessment on the patient. ALL assessment sites listed below have been assessed on admission. Areas assessed by both nurses:   [x]   Head, Face, and Ears   [x]   Shoulders, Back, and Chest  [x]   Arms, Elbows, and Hands   [x]   Coccyx, Sacrum, and Ischium  [x]   Legs, Feet, and Heels        Does the Patient have Skin Breakdown? No    Scattered scaring BLE and back.      Earl Prevention initiated:  No   Wound Care Orders initiated:  No      [facility] nurse consulted for Pressure Injury (Stage 3,4, Unstageable, DTI, NWPT, and Complex wounds) or Earl score 18 or lower:  No      Nurse 1 eSignature: Electronically signed by Deonna Abdullahi RN on 11/4/22 at 4:52 AM EDT    **SHARE this note so that the co-signing nurse is able to place an eSignature**    Nurse 2 eSignature: Electronically signed by Rakesh Roberts RN on 11/4/22 at 4:55 AM EDT

## 2022-11-04 NOTE — ED NOTES
ED Attending at bedside updating family and pt on POC. Questions and concerns being addressed at this time.      Ramirez Dick RN  11/04/22 6996

## 2022-11-04 NOTE — H&P
Internal Medicine  PGY 1  History & Physical      CC blood in stool    History Obtained From:  patient, electronic medical record    HISTORY OF PRESENT ILLNESS:    81-year-old female with a past medical history of hypertension diabetes, stroke in around 1993 came to [facility] ED due to complaint complaints of blood in stool. Patient reported that she had 3 episodes of blood in stool where she noticed her stool to be dark in color and felt dizzy after the second episode. This was the first time that the patient noticed this. Patient did not report any hematemesis or hemoptysis. Patient reports that she has never had a EGD or colonoscopy done. Patient reports no history of ulcers or H. pylori infection. Did not report nausea vomiting or diarrhea, but did report constipation. Patient did mention that she has been taking ibuprofen 600 mg almost daily for 2 to 3 weeks that she was prescribed for right toe pain. She mentioned she was prescribed ibuprofen 600 mg twice daily if needed but she has not required that often. Patient did report some chest discomfort with associated shortness of breath but she attributed that to her history of smoking, reported no other symptoms, has no other concerns or complaints. In the ED, patient underwent MANDY that showed kj blood and was also given a unit of PRBCs. Past Medical History:        Diagnosis Date    Diabetes mellitus ([facility] [address]     Hypertension     Stroke [facility])        Past Surgical History:    History reviewed. No pertinent surgical history. Surgical History reviewed with patient and negative for recent surgies. Medications Priorto Admission:    Not in a hospital admission. Allergies:  Codeine    Social History:   TOBACCO:   reports that she has been smoking cigarettes. She has a 20.00 pack-year smoking history. She has never used smokeless tobacco.  ETOH:   reports current alcohol use. DRUGS : denies use  Patient currently lives     Family History:   History reviewed. No pertinent family history. Family History reviewed with patient, and does not pertain and non-contributory to the current illness  Review of Systems  Constitutional:  No Fever, No Chills, No Night Sweats  ENT/Mouth:  No Nasal Congestion,  No Hoarseness, No new mouth lesion  Eyes:  No Eye Pain, No Redness, No Discharge  Cardiovascular:  No Chest Pain, No Dyspnea on Exertion, No Palpitations  Respiratory:  No Cough, No Sputum, No Dyspnea  Gastrointestinal:  No Nausea, No Vomiting, No Diarrhea,   Genitourinary: No urinary Frequency, No Hematuria, No Urinary pain  Musculoskeletal:  No worsening Arthralgias, No worsening Myalgias  Skin:  No new Skin Lesions, No new skin   Neuro:  No new weakness, No new numbness. Psych:  No suicial ideation, No Violence ideation    ROS: A 10 point review of systems was conducted, significant findings as noted in HPI. Physical Exam  Constitutional:       General: She is not in acute distress. Appearance: She is not diaphoretic. HENT:      Right Ear: External ear normal.      Left Ear: External ear normal.   Eyes:      General:         Right eye: No discharge. Left eye: No discharge. Extraocular Movements: Extraocular movements intact. Conjunctiva/sclera: Conjunctivae normal.   Cardiovascular:      Rate and Rhythm: Normal rate and regular rhythm. Heart sounds: Normal heart sounds. Pulmonary:      Effort: Pulmonary effort is normal. No respiratory distress. Breath sounds: Normal breath sounds. Abdominal:      General: Bowel sounds are normal.      Tenderness: There is no abdominal tenderness. Musculoskeletal:      Cervical back: Normal range of motion. Right lower leg: No edema. Left lower leg: No edema. Neurological:      Mental Status: She is alert and oriented to person, place, and time.    Psychiatric:         Behavior: Behavior normal.     Physical exam:       Vitals:    11/04/22 0158   BP: 116/73   Pulse: 85   Resp: 20   Temp: SpO2: 100%       DATA:    Labs:  CBC:   Recent Labs     11/03/22 2346   WBC 10.5   HGB 11.5*   HCT 33.9*          BMP:   Recent Labs     11/03/22 2346      K 4.0      CO2 26   BUN 25*   CREATININE 0.8   GLUCOSE 208*     LFT's: No results for input(s): AST, ALT, ALB, BILITOT, ALKPHOS in the last 72 hours. Troponin: No results for input(s): TROPONINI in the last 72 hours. BNP:No results for input(s): BNP in the last 72 hours. ABGs: No results for input(s): PHART, IIL6YXX, PO2ART in the last 72 hours. INR:   Recent Labs     11/03/22 2346   INR 1.07       U/A:No results for input(s): NITRITE, COLORU, PHUR, LABCAST, WBCUA, RBCUA, MUCUS, TRICHOMONAS, YEAST, BACTERIA, CLARITYU, SPECGRAV, LEUKOCYTESUR, UROBILINOGEN, BILIRUBINUR, BLOODU, GLUCOSEU, AMORPHOUS in the last 72 hours. Invalid input(s): Rachana Soto    CTA ABDOMEN PELVIS W WO CONTRAST   Final Result      This exam is severely degraded by motion making it impossible to determine if there is contrast extravasation into the bowel lumen. Evaluation with nuclear medicine tagged red blood cell imaging may be helpful.                   ASSESSMENT AND PLAN:    acute lower GIB  Likely secondary to current cigarette use and recent ibuprofen use, no history of ulcers or H. pylori as per patient  At least 3 episodes of dark appearing stool as per HPI, dizziness, kj blood seen on MANDY in the ED, patient taking 600 mg ibuprofen almost daily for 2 to 3 weeks, current smoker, never had a EGD or colonoscopy done  Hemoglobin 11.5  - N.p.o.  - Normal saline fluids  - GI consult  - No anticoagulation  - Protonix 40 twice daily  - Monitor CBC    Chronic hep C  Untreated  PT, INR, platelets normal, no abdominal tenderness  - Consider LFTs and bilirubin studies    Normocytic anemia  Likely secondary to suspected acute LGIB hemoglobin 11.5, was 13.8 in 9/19  - Monitor CBC     Hypertension  BP controlled at 116/73  - Monitor vitals    DM2  - Medium dose sliding scale insulin    Schizophrenia  - Continue home Zyprexa    discussed with attending physician Dr. Laurita Harvey Status:Full code  FEN: N.p.o.  PPX: SCDs  DISPO: Caitie Major MD PGY1  11/4/2022,  3:24 AM    I saw the patient independently from the resident . I discussed the care with the resident. I personally reviewed the HPI, PH, FH, SH, ROS and medications. I repeated pertinent portions of the examination and reviewed the relevant imaging and laboratory data. I agree with the findings, assessment and plan as documented. addition to: Plan as above patient 79-year-old female admitted for acute GI bleed, GI consulted, Protonix 40 IV twice daily.   Does have history of chronic hep C however no history of cirrhosis, platelet PT/INR normal.  Plan as above

## 2022-11-04 NOTE — ED PROVIDER NOTES
[address] [place]          ATTENDING PHYSICIAN NOTE       Date of evaluation: 11/3/2022    Chief Complaint     Rectal Bleeding (Pt brought in by EMS for sudden onset of dark rectal bleeding at dinner time today. Pt is lethargic, alert and oriented x 4. Per EMS blood pressure was 70/40. Pt c/o left sided abdominal pain that started a few minutes ago. Pt states she is SOB and requesting oxygen therapy )      History of Present Illness     Autumn Mcardle is a 78 y.o. female who presents with a complaint of bright and dark red blood per rectum and lightheadedness. Patient had sudden onset of dark red blood in stool, filling the toilet bowl. Was initially awake and alert became more hypotensive and lethargic in route. Here, she is moaning complaining of lower abdominal pain, but otherwise minimally coherent. She denies any use of anticoagulants or antiplatelet agents states she is prescribed some but does not take them because she does not have any symptoms. It is unclear what they are prescribed for her. Review of Systems     Review of Systems   Unable to perform ROS: Unstable vital signs     Past Medical, Surgical, Family, and Social History     She has a past medical history of Diabetes mellitus ([facility] Hypertension, and Stroke ([facility] She has no past surgical history on file. Her family history is not on file. She reports that she has been smoking cigarettes. She has a 20.00 pack-year smoking history. She has never used smokeless tobacco. She reports current alcohol use. She reports that she does not use drugs.     Medications     Current Discharge Medication List        CONTINUE these medications which have NOT CHANGED    Details   acetaminophen (TYLENOL) 500 MG tablet Take 1 tablet by mouth 4 times daily as needed for Pain  Qty: 360 tablet, Refills: 1      ibuprofen (ADVIL;MOTRIN) 600 MG tablet Take 1 tablet by mouth 4 times daily as needed for Pain  Qty: 40 tablet, Refills: 0      OLANZapine (ZYPREXA) 10 MG tablet Take 10 mg by mouth nightly      metFORMIN (GLUCOPHAGE) 500 MG tablet Take 500 mg by mouth 2 times daily (with meals). diphenhydrAMINE (SOMINEX) 25 MG tablet Take 25 mg by mouth nightly as needed for Sleep.      naproxen sodium (ANAPROX DS) 550 MG tablet Take 1 tablet by mouth 2 times daily (with meals) for 14 doses. Qty: 14 tablet, Refills: 0             Allergies     She is allergic to codeine. Physical Exam     INITIAL VITALS: BP: (!) 72/50, Temp: 97.6 °F (36.4 °C), Heart Rate: 68, Resp: 20, SpO2: 97 %   Physical Exam  Constitutional: This is an ill-appearing elderly female, who is uncomfortable, and moaning    HEENT: NC/AT. PERRL 4-2 bilaterally. EOMI. CV:Heart is regular rate and rhythm without murmurs, rubs or gallops. Resp: Respirations unlabored. Lungs clear to auscultation w/o wheezing. Abd: Soft, tender to palpation bilateral lower quadrants. : She has dark blood visible on her buttocks and intergluteal crease, and dried blood visible on her undergarments. No melena noted. No external hemorrhoids appreciated. MSK: Full ROM, no edema or tenderness to palpation. Skin: Extremities are warm and well perfused. 2+ radial pulses . Cap Refill <3 seconds. Neuro: Oriented to name, but otherwise disoriented. Cranial nerves grossly intact   Strength and sensation intact x4 extremities. Psych: Sleepy and somnolent, does not appear to attend to external stimuli. No agitation. Diagnostic Results         RADIOLOGY:  CTA ABDOMEN PELVIS W WO CONTRAST   Final Result      This exam is severely degraded by motion making it impossible to determine if there is contrast extravasation into the bowel lumen. Evaluation with nuclear medicine tagged red blood cell imaging may be helpful.                 LABS:   Results for orders placed or performed during the hospital encounter of 11/03/22   CBC with Auto Differential   Result Value Ref Range    WBC 10.5 4.0 - 11.0 K/uL    RBC 3.64 (L) 4.00 - 5.20 M/uL    Hemoglobin 11.5 (L) 12.0 - 16.0 g/dL    Hematocrit 33.9 (L) 36.0 - 48.0 %    MCV 93.0 80.0 - 100.0 fL    MCH 31.6 26.0 - 34.0 pg    MCHC 33.9 31.0 - 36.0 g/dL    RDW 12.9 12.4 - 15.4 %    Platelets 853 936 - 141 K/uL    MPV 7.9 5.0 - 10.5 fL    Neutrophils % 54.5 %    Lymphocytes % 38.2 %    Monocytes % 5.1 %    Eosinophils % 1.8 %    Basophils % 0.4 %    Neutrophils Absolute 5.7 1.7 - 7.7 K/uL    Lymphocytes Absolute 4.0 1.0 - 5.1 K/uL    Monocytes Absolute 0.5 0.0 - 1.3 K/uL    Eosinophils Absolute 0.2 0.0 - 0.6 K/uL    Basophils Absolute 0.0 0.0 - 0.2 K/uL   Basic Metabolic Panel w/ Reflex to MG   Result Value Ref Range    Sodium 137 136 - 145 mmol/L    Potassium reflex Magnesium 4.0 3.5 - 5.1 mmol/L    Chloride 103 99 - 110 mmol/L    CO2 26 21 - 32 mmol/L    Anion Gap 8 3 - 16    Glucose 208 (H) 70 - 99 mg/dL    BUN 25 (H) 7 - 20 mg/dL    Creatinine 0.8 0.6 - 1.2 mg/dL    Est, Glom Filt Rate >60 >60    Calcium 8.8 8.3 - 10.6 mg/dL   Protime-INR   Result Value Ref Range    Protime 13.8 11.7 - 14.5 sec    INR 1.07 0.87 - 1.14   PTT   Result Value Ref Range    aPTT 26.6 23.0 - 34.3 sec   TYPE AND SCREEN   Result Value Ref Range    ABO/Rh O POS     Antibody Screen NEG    PREPARE RBC (CROSSMATCH)   Result Value Ref Range    Product Code Blood Bank [id number]     Description Blood Bank Red Blood Cells, Apheresis, Leuko-reduced     Unit Number [id number]     Dispense Status Blood Bank selected     Product Code Blood Bank [id number]     Description Blood Bank Red Blood Cells, Leuko-reduced     Unit Number [id number]     Dispense Status Blood Bank issued        ED BEDSIDE ULTRASOUND:  No results found. RECENT VITALS:  BP: 107/73,Temp: 98.5 °F (36.9 °C), Heart Rate: 86, Resp: 18, SpO2: 98 %     Procedures         ED Course     Nursing Notes, Past Medical Hx, Past Surgical Hx, Social Hx,Allergies, and Family Hx were reviewed.          patient was given the following medications:  Orders Placed This Encounter   Medications    pantoprazole (PROTONIX) injection 40 mg    DISCONTD: tranexamic acid (CYKLOKAPRON) injection 1,000 mg    iopamidol (ISOVUE-370) 76 % injection 75 mL    sodium chloride flush 0.9 % injection 5-40 mL    sodium chloride flush 0.9 % injection 5-40 mL    0.9 % sodium chloride infusion    OR Linked Order Group     ondansetron (ZOFRAN-ODT) disintegrating tablet 4 mg     ondansetron (ZOFRAN) injection 4 mg    polyethylene glycol (GLYCOLAX) packet 17 g    OR Linked Order Group     acetaminophen (TYLENOL) tablet 650 mg     acetaminophen (TYLENOL) suppository 650 mg    OLANZapine (ZYPREXA) tablet 10 mg    pantoprazole (PROTONIX) injection 40 mg    DISCONTD: 0.9 % sodium chloride infusion    glucose chewable tablet 16 g    OR Linked Order Group     dextrose bolus 10% 125 mL     dextrose bolus 10% 250 mL    glucagon (rDNA) injection 1 mg    dextrose 10 % infusion    insulin lispro (1 Unit Dial) (HUMALOG/ADMELOG) pen 0-4 Units    insulin lispro (1 Unit Dial) (HUMALOG/ADMELOG) pen 0-4 Units    0.9 % sodium chloride infusion       CONSULTS:  IP CONSULT TO GI  IP CONSULT TO HOSPITALIST    MEDICAL DECISIONMAKING / ASSESSMENT / Riya Kulwinder is a 78 y.o. female presenting with a complaint of bright and dark red blood per rectum and hypotension. On arrival, 2 IV access points were established and fluids from EMS were finished for we are awaiting emergent blood. Her ongoing hemodynamic instability dictated the need for emergent uncrossed matched blood. 1 unit of O- blood was given to the patient with subsequent improvement in her hemodynamics. Her hemoglobin returned at 11.5, and she had no further hypotension or acute bleeding noted here. CTA of her abdomen was motion limited but did not show any active extravasation. The patient regained mental status, had some mild dementia but was otherwise alert oriented and appropriate.   She and her daughter do not recall any history of prior lower GI bleeding or any recent colonoscopy and there was none noted in the chart. I discussed the case with Dr. Perla Velasco on-call for GI, who requested she be made n.p.o. She will be admitted to the hospitalist service, for serial hemoglobins, hemodynamic monitoring, and GI evaluation in the morning. .    Critical Care:  Due to the immediate potential for life-threatening deterioration due to acute blood loss anemia and hypovolemic shock, I spent 40 minutes providing critical care. This time excludes time spent performing procedures but includes time spent on direct patient care, history retrieval, review of the chart, and discussions with patient, family, and consultant(s). Clinical Impression     1. Lower GI bleed        Disposition     PATIENT REFERRED TO:  No follow-up provider specified.     DISCHARGE MEDICATIONS:  Current Discharge Medication List          DISPOSITION Admitted 11/04/2022 02:20:54 AM          Víctor Jean Baptiste MD  11/04/22 8857

## 2022-11-04 NOTE — ED TRIAGE NOTES
Pt brought in by EMS for sudden onset of dark rectal bleeding at dinner time today. Pt is lethargic, alert and oriented x 4. Per EMS blood pressure was 70/40. Pt c/o left sided abdominal pain that started a few minutes ago.  Pt states she is SOB and requesting oxygen therapy

## 2022-11-04 NOTE — PROGRESS NOTES
Internal Medicine Progress Note    Patient Name: Araceli Chand   Admit Date: 11/3/2022   Code:Full Code  PCP: Celestine Sinclair MD     CC: Rectal Bleeding (Pt brought in by EMS for sudden onset of dark rectal bleeding at dinner time today. Pt is lethargic, alert and oriented x 4. Per EMS blood pressure was 70/40. Pt c/o left sided abdominal pain that started a few minutes ago. Pt states she is SOB and requesting oxygen therapy )       Subjective     Interval History:   Overnight: No acute events overnight    On today's encounter Ms. Matthew Miner says she was feeling better and not in any visible distress. Ms. Matthew Miner says she is not experiencing the same left-sided abdominal pain she felt yesterday in the ED. Patient has passed stool since being admitted and states she has not noticed passage of blood since admission. Pt states she occasionally feels short of breath and is a current 1 PPD smoker. Pt acknowledges non-compliance with medications prescribed for chronic conditions. She denies any significant past or present alcohol consumption. She denies CP, f/c, n/v, abdominal pain, abdominal swelling/distention, lethargy, and lightheadedness. ROS:  As per interval history above. Objective     Vital Signs:  Patient Vitals for the past 8 hrs:   BP Temp Temp src Pulse Resp SpO2   11/04/22 1220 133/89 98.3 °F (36.8 °C) Oral 79 18 --   11/04/22 1030 -- 98.3 °F (36.8 °C) Oral 86 19 98 %   11/04/22 0848 135/84 98.2 °F (36.8 °C) Oral 82 19 99 %   11/04/22 0639 123/84 -- -- 86 -- --       Physical Exam:  Physical Exam  Constitutional:       General: She is not in acute distress. Appearance: She is obese. Comments: Alert & oriented   Cardiovascular:      Rate and Rhythm: Normal rate and regular rhythm. Pulses: Normal pulses. Heart sounds: No murmur heard. Pulmonary:      Effort: No respiratory distress. Breath sounds: No wheezing. Chest:      Chest wall: No tenderness.    Abdominal: General: Bowel sounds are normal. There is no distension. Palpations: Abdomen is soft. Tenderness: There is no abdominal tenderness. There is no guarding or rebound. Musculoskeletal:      Right lower leg: No edema. Left lower leg: No edema. Neurological:      General: No focal deficit present. Mental Status: She is alert. Intake/Output Summary (Last 24 hours) at 11/4/2022 1434  Last data filed at 11/4/2022 0030  Gross per 24 hour   Intake 300 ml   Output --   Net 300 ml        Labs:  CBC:   Recent Labs     11/03/22 2346 11/04/22  0441 11/04/22  1158   WBC 10.5 12.3*  --    HGB 11.5* 12.0 12.0   HCT 33.9* 35.4* 35.2*    185  --    MCV 93.0 90.8  --        Renal:    Recent Labs     11/03/22 2346 11/04/22 0442    139   K 4.0 4.3    105   CO2 26 27   BUN 25* 23*   CREATININE 0.8 0.7   GLUCOSE 208* 136*   CALCIUM 8.8 9.1   ANIONGAP 8 7       Hepatic:   Recent Labs     11/04/22  1158   AST 43*   ALT 27   BILITOT 0.5   BILIDIR <0.2   PROT 6.2*   LABALBU 3.6   ALKPHOS 65       Troponin: Invalid input(s): TROPONIN    Lactic acid: Invalid input(s): LACTICACID    BNP: No results for input(s): BNP in the last 72 hours. Pro-BNP: No results for input(s): PROBNP in the last 72 hours. Lipids: No results for input(s): CHOL, TRIG, HDL, LDLCALC, VLDL in the last 72 hours. ABGs:  No results for input(s): PHART, CDP2ZKI, PO2ART, NYU1WXN, BEART, THGBART, X7SEONSB, JJQ2SOX in the last 72 hours. VBGs: No results for input(s): PHVEN, GDU7GAW, PO2VEN in the last 72 hours. INR:   Recent Labs     11/03/22 2346   INR 1.07     aPTT:   Recent Labs     11/03/22 2346   APTT 26.6       Procalcitonin: No results for input(s): PROCAL in the last 72 hours. CRP: No results for input(s): CRP in the last 72 hours. ESR: No results for input(s): ESR in the last 72 hours.     Radiology:  CTA ABDOMEN PELVIS W WO CONTRAST   Final Result      This exam is severely degraded by motion making it impossible to determine if there is contrast extravasation into the bowel lumen. Evaluation with nuclear medicine tagged red blood cell imaging may be helpful. US LIVER    (Results Pending)       Medications:   sodium chloride flush  5-40 mL IntraVENous 2 times per day    OLANZapine  10 mg Oral Nightly    pantoprazole  40 mg IntraVENous BID    insulin lispro  0-4 Units SubCUTAneous TID WC    insulin lispro  0-4 Units SubCUTAneous Nightly    bisacodyl  20 mg Oral Once    polyethylene glycol  4,000 mL Oral Once       sodium chloride      dextrose      sodium chloride 100 mL/hr at 11/04/22 0800      sodium chloride flush, sodium chloride, ondansetron **OR** ondansetron, polyethylene glycol, acetaminophen **OR** acetaminophen, glucose, dextrose bolus **OR** dextrose bolus, glucagon (rDNA), dextrose     Assessment & Plan       -Upper vs Lower GI bleed: There is high clinical suspicion for lower GI bleed with possibility of upper bleed. Patient presented with episodic bright red kj blood in stools multiple times (3x) accompanied by diffuse abdominal pain with lingering sharp tenderness in the LLQ. Patient has hx of chronic hepatitis C, cirrhosis, has never had a colonoscopy, and was taking Ibuprofen 600 mg BID for ~3 weeks just recently.  Pt had a previous EGD done which showed presence of upper esophageal varices.    -Consulted GI    -Colonoscopy and EGD planned for today: pending    -Bowel regimen for prep: Bisacodyl & Polyethylene Glycol    -GI recommendations: pending   -Continue IVF   -Continue IV Pantoprazole 40 mg BID   -Continue monitoring H&H:    -Hgb: 11.5 -> 12 (11/4/22)    -Hct: 35.4 -> 35.2 (11/4/22)   -Ordered LFTs:    -AST: 43 (11/4/22)    -ALT: 27 (11/4/22)      Chronic Medical Conditions    -Chronic Hepatitis C/Cirrhosis      -Hypertension        -Type 2 Diabetes Mellitus   -Continue lispro sc 0.4-u   -Continue lispro sc 0.4-u TID w/ meals        -Schizophrenia    -Continue home Olanzapine            DVT PPx:  Diet: NPO  Code status:  Full Code   ELOS:  Barriers to discharge:  Disposition  - Preadmission:  - Current:  - Upon discharge:     Will discuss with attending physician Dr. Tim Stephens MD   ________________________  Alia Bates,   PGY-1, Internal Medicine  11/04/22  2:34 PM

## 2022-11-04 NOTE — PLAN OF CARE
Problem: Pain  Goal: Verbalizes/displays adequate comfort level or baseline comfort level  Outcome: Progressing  Flowsheets (Taken 11/4/2022 0618)  Verbalizes/displays adequate comfort level or baseline comfort level:   Encourage patient to monitor pain and request assistance   Assess pain using appropriate pain scale  Note: No pain reported from pt. Problem: ABCDS Injury Assessment  Goal: Absence of physical injury  Outcome: Progressing  Flowsheets (Taken 11/4/2022 0618)  Absence of Physical Injury: Implement safety measures based on patient assessment  Note: Pt educated on use of the call light. Problem: Safety - Adult  Goal: Free from fall injury  Outcome: Progressing  Flowsheets (Taken 11/4/2022 0618)  Free From Fall Injury:   Instruct family/caregiver on patient safety   Based on caregiver fall risk screen, instruct family/caregiver to ask for assistance with transferring infant if caregiver noted to have fall risk factors  Note: Bed in lowest position, call light within reach, bed alarm in use.

## 2022-11-04 NOTE — PROGRESS NOTES
Comprehensive Nutrition Assessment    RECOMMENDATIONS:  PO Diet: Clear Liquid adv per MD  ONS: Ensure Clear TID  Nutrition Education: No recommendation at this time       NUTRITION ASSESSMENT:   Nutritional summary & status: Nutrition assessment triggered from admission MST score of 2, indicating wt loss and poor po intake.  lbs with EMR history showing no significant weight change. Medical staff in room, upon attempted visit. Pt is currently on a Clear Liquid Diet. No meal intake data available as diet advanced from NPO this am. Will add Ensure Clear to trays for additional nutrition. Continue to follow. Admission/PMH: Admit; Acute GI Bleed. PMHx: HTN, DM, Stroke    MALNUTRITION ASSESSMENT  Context of Malnutrition: Acute Illness   Malnutrition Status: Insufficient data  Findings of the 6 clinical characteristics of malnutrition (Minimum of 2 out of 6 clinical characteristics is required to make the diagnosis of moderate or severe Protein Calorie Malnutrition based on AND/ASPEN Guidelines):  Energy Intake:  Unable to assess  Weight Loss:  No significant weight loss     Body Fat Loss:  Unable to assess     Muscle Mass Loss:  Unable to assess    Fluid Accumulation:  No significant fluid accumulation         NUTRITION DIAGNOSIS   Inadequate oral intake related to altered GI function, inadequate protein-energy intake as evidenced by NPO or clear liquid status due to medical condition, poor intake prior to admission    Nutrition Monitoring and Evaluation:   Food/Nutrient Intake Outcomes:  Diet Advancement/Tolerance, Food and Nutrient Intake, Supplement Intake  Physical Signs/Symptoms Outcomes:  Biochemical Data, Nutrition Focused Physical Findings, Weight, GI Status     OBJECTIVE DATA: Significant to nutrition assessment  Nutrition Related Findings: loose stool 11/04. Lytes wnl. Glu 116. No edema.   Wounds: None  Nutrition Goals: PO intake 50% or greater (adv diet)     CURRENT NUTRITION THERAPIES  ADULT DIET; Clear Liquid  ADULT ORAL NUTRITION SUPPLEMENT; Breakfast, Lunch, Dinner; Clear Liquid Oral Supplement  PO Intake: Unable to assess (diet adv this am)   PO Supplement Intake:None Ordered  Additional Sources of Calories/IVF:n/a     ANTHROPOMETRICS  Current Height: 5' 3\" (160 cm)  Current Weight: 175 lb 4.3 oz (79.5 kg)    Admission weight: 186 lb 3.2 oz (84.5 kg)  Ideal Body Weight (IBW): 115 lbs  (52 kg)    BMI: 31.1    COMPARATIVE STANDARDS  Energy (kcal):  5725-7971(33-18 kcal/CBW/79.5 kg)     Protein (g):  62-93(1.2-1.8/52 kg)       Fluid (mL/day):  9532-3201 (1ml/kcal)    The patient will be monitored per nutrition standards of care. Consult dietitian if additional nutrition interventions are needed prior to RD reassessment.      Iglesia Fernandes, [address]:  [phone]  Office:  [phone]

## 2022-11-04 NOTE — ED NOTES
First unit uncrossed blood transfused through [facility] rapid infuser.  MD states to have pt go to CT now that pt is hemodynamically stable, then will transfuse second unit of blood if pt becomes hemodynamically unstable      Neeru Badillo RN  11/04/22 4239

## 2022-11-04 NOTE — PROGRESS NOTES
Pt transferred to room 4321 from the ED. VSS. Pt oriented to the room , and floor policies. Pt is a medium fall risk, educated on all fall precautions. Pt currently resting comfortably in bed. Will continue to monitor.    Electronically signed by Roman Espinoza RN on 11/4/2022 at 4:53 AM

## 2022-11-04 NOTE — CONSULTS
GI Consult Note    Yolanda Negrete is a 78 y.o. female patient.   1. Lower GI bleed        Admit Date: 11/3/2022    Subjective:       Seen at bed side  Feels ok  Denied abdominal pain  Bloody BM      ROS:  Cardiovascular ROS: no chest pain or dyspnea on exertion  Gastrointestinal ROS: as above  Respiratory ROS: no cough, shortness of breath, or wheezing    Scheduled Meds:   sodium chloride flush  5-40 mL IntraVENous 2 times per day    OLANZapine  10 mg Oral Nightly    pantoprazole  40 mg IntraVENous BID    insulin lispro  0-4 Units SubCUTAneous TID     insulin lispro  0-4 Units SubCUTAneous Nightly       Continuous Infusions:   sodium chloride      dextrose      sodium chloride 100 mL/hr at 11/04/22 0800       PRN Meds:  sodium chloride flush, sodium chloride, ondansetron **OR** ondansetron, polyethylene glycol, acetaminophen **OR** acetaminophen, glucose, dextrose bolus **OR** dextrose bolus, glucagon (rDNA), dextrose      Objective:       Patient Vitals for the past 24 hrs:   BP Temp Temp src Pulse Resp SpO2 Height Weight   11/04/22 1514 -- -- -- -- -- -- 5' 3\" (1.6 m) --   11/04/22 1220 133/89 98.3 °F (36.8 °C) Oral 79 18 -- -- --   11/04/22 1030 -- 98.3 °F (36.8 °C) Oral 86 19 98 % -- --   11/04/22 0848 135/84 98.2 °F (36.8 °C) Oral 82 19 99 % -- --   11/04/22 0639 123/84 -- -- 86 -- -- -- --   11/04/22 0400 107/73 98.5 °F (36.9 °C) Oral 86 18 98 % -- --   11/04/22 0158 116/73 -- -- 85 20 100 % -- 175 lb 4.3 oz (79.5 kg)   11/04/22 0035 (!) 114/93 98.4 °F (36.9 °C) Oral 82 19 100 % -- --   11/04/22 0019 96/61 98 °F (36.7 °C) Oral 83 18 100 % -- --   11/03/22 2345 99/64 -- -- 77 17 100 % -- --   11/03/22 2330 (!) 72/50 -- -- 68 19 98 % -- --   11/03/22 2327 (!) 72/50 97.6 °F (36.4 °C) Oral 68 20 97 % 5' 3\" (1.6 m) 186 lb 3.2 oz (84.5 kg)       Exam:    VITALS:  /89   Pulse 79   Temp 98.3 °F (36.8 °C) (Oral)   Resp 18   Ht 5' 3\" (1.6 m)   Wt 175 lb 4.3 oz (79.5 kg)   SpO2 98%   BMI 31.05 kg/m² TEMPERATURE:  Current - Temp: 98.3 °F (36.8 °C); Max - Temp  Av.2 °F (36.8 °C)  Min: 97.6 °F (36.4 °C)  Max: 98.5 °F (36.9 °C)    NAD  General appearance: alert, appears stated age, cooperative and no distress  Head: Normocephalic, without obvious abnormality, atraumatic  Neck: supple, symmetrical, trachea midline and thyroid not enlarged, symmetric, no tenderness/mass/nodules  CVS:  RRR, Nl s1s2  Lungs CTA Bilaterally, normal effort  Abdomen: soft, non-tender; bowel sounds normal; no masses,  no organomegaly  AAOx3, No asterixis or encephalopathy  Extremities: No edema. Lab Data:  Recent Labs     221 22  1158   WBC 10.5 12.3*  --    HGB 11.5* 12.0 12.0   HCT 33.9* 35.4* 35.2*   MCV 93.0 90.8  --     185  --      Recent Labs     22  044    139   K 4.0 4.3    105   CO2 26 27   BUN 25* 23*   CREATININE 0.8 0.7     Recent Labs     22  1158   AST 43*   ALT 27   BILIDIR <0.2   BILITOT 0.5   ALKPHOS 65     No results for input(s): LIPASE, AMYLASE in the last 72 hours. Recent Labs     22  1158   PROT  --  6.2*   INR 1.07  --      No results for input(s): PTT in the last 72 hours. No results for input(s): OCCULTBLD in the last 72 hours. Assessment:       Principal Problem:    Acute GI bleeding  Resolved Problems:    * No resolved hospital problems.  *        Recommendations:       Egd and colonoscopy in am  Discussed with patient and answered her questions    Zena Patterson MD  2022  3:57 PM

## 2022-11-05 ENCOUNTER — ANESTHESIA EVENT (OUTPATIENT)
Dept: ENDOSCOPY | Age: 67
DRG: 378 | End: 2022-11-05
Payer: MEDICARE

## 2022-11-05 ENCOUNTER — ANESTHESIA (OUTPATIENT)
Dept: ENDOSCOPY | Age: 67
DRG: 378 | End: 2022-11-05
Payer: MEDICARE

## 2022-11-05 LAB
ANION GAP SERPL CALCULATED.3IONS-SCNC: 6 MMOL/L (ref 3–16)
BASOPHILS ABSOLUTE: 0 K/UL (ref 0–0.2)
BASOPHILS RELATIVE PERCENT: 0.3 %
BUN BLDV-MCNC: 13 MG/DL (ref 7–20)
CALCIUM SERPL-MCNC: 8.5 MG/DL (ref 8.3–10.6)
CHLORIDE BLD-SCNC: 111 MMOL/L (ref 99–110)
CO2: 26 MMOL/L (ref 21–32)
CREAT SERPL-MCNC: 0.6 MG/DL (ref 0.6–1.2)
EOSINOPHILS ABSOLUTE: 0.2 K/UL (ref 0–0.6)
EOSINOPHILS RELATIVE PERCENT: 2.6 %
GFR SERPL CREATININE-BSD FRML MDRD: >60 ML/MIN/{1.73_M2}
GLUCOSE BLD-MCNC: 103 MG/DL (ref 70–99)
GLUCOSE BLD-MCNC: 106 MG/DL (ref 70–99)
GLUCOSE BLD-MCNC: 96 MG/DL (ref 70–99)
GLUCOSE BLD-MCNC: 99 MG/DL (ref 70–99)
HCT VFR BLD CALC: 23.4 % (ref 36–48)
HCT VFR BLD CALC: 27.1 % (ref 36–48)
HCT VFR BLD CALC: 27.4 % (ref 36–48)
HCT VFR BLD CALC: 28.7 % (ref 36–48)
HEMOGLOBIN: 8 G/DL (ref 12–16)
HEMOGLOBIN: 9.2 G/DL (ref 12–16)
HEMOGLOBIN: 9.2 G/DL (ref 12–16)
HEMOGLOBIN: 9.7 G/DL (ref 12–16)
LYMPHOCYTES ABSOLUTE: 3.3 K/UL (ref 1–5.1)
LYMPHOCYTES RELATIVE PERCENT: 43 %
MCH RBC QN AUTO: 30.8 PG (ref 26–34)
MCHC RBC AUTO-ENTMCNC: 33.9 G/DL (ref 31–36)
MCV RBC AUTO: 90.8 FL (ref 80–100)
MONOCYTES ABSOLUTE: 0.5 K/UL (ref 0–1.3)
MONOCYTES RELATIVE PERCENT: 5.9 %
NEUTROPHILS ABSOLUTE: 3.7 K/UL (ref 1.7–7.7)
NEUTROPHILS RELATIVE PERCENT: 48.2 %
PDW BLD-RTO: 13.9 % (ref 12.4–15.4)
PERFORMED ON: ABNORMAL
PERFORMED ON: ABNORMAL
PERFORMED ON: NORMAL
PLATELET # BLD: 158 K/UL (ref 135–450)
PMV BLD AUTO: 8 FL (ref 5–10.5)
POTASSIUM REFLEX MAGNESIUM: 3.8 MMOL/L (ref 3.5–5.1)
RBC # BLD: 3.16 M/UL (ref 4–5.2)
SODIUM BLD-SCNC: 143 MMOL/L (ref 136–145)
WBC # BLD: 7.7 K/UL (ref 4–11)

## 2022-11-05 PROCEDURE — 6370000000 HC RX 637 (ALT 250 FOR IP): Performed by: STUDENT IN AN ORGANIZED HEALTH CARE EDUCATION/TRAINING PROGRAM

## 2022-11-05 PROCEDURE — 0DB68ZX EXCISION OF STOMACH, VIA NATURAL OR ARTIFICIAL OPENING ENDOSCOPIC, DIAGNOSTIC: ICD-10-PCS | Performed by: INTERNAL MEDICINE

## 2022-11-05 PROCEDURE — 88305 TISSUE EXAM BY PATHOLOGIST: CPT

## 2022-11-05 PROCEDURE — 85018 HEMOGLOBIN: CPT

## 2022-11-05 PROCEDURE — 85014 HEMATOCRIT: CPT

## 2022-11-05 PROCEDURE — 85025 COMPLETE CBC W/AUTO DIFF WBC: CPT

## 2022-11-05 PROCEDURE — 0W3P8ZZ CONTROL BLEEDING IN GASTROINTESTINAL TRACT, VIA NATURAL OR ARTIFICIAL OPENING ENDOSCOPIC: ICD-10-PCS | Performed by: INTERNAL MEDICINE

## 2022-11-05 PROCEDURE — 7100000010 HC PHASE II RECOVERY - FIRST 15 MIN: Performed by: INTERNAL MEDICINE

## 2022-11-05 PROCEDURE — 7100000011 HC PHASE II RECOVERY - ADDTL 15 MIN: Performed by: INTERNAL MEDICINE

## 2022-11-05 PROCEDURE — 3700000000 HC ANESTHESIA ATTENDED CARE: Performed by: INTERNAL MEDICINE

## 2022-11-05 PROCEDURE — 36415 COLL VENOUS BLD VENIPUNCTURE: CPT

## 2022-11-05 PROCEDURE — 2500000003 HC RX 250 WO HCPCS: Performed by: INTERNAL MEDICINE

## 2022-11-05 PROCEDURE — 88342 IMHCHEM/IMCYTCHM 1ST ANTB: CPT

## 2022-11-05 PROCEDURE — 3609012400 HC EGD TRANSORAL BIOPSY SINGLE/MULTIPLE: Performed by: INTERNAL MEDICINE

## 2022-11-05 PROCEDURE — 3609013000 HC EGD TRANSORAL CONTROL BLEEDING ANY METHOD: Performed by: INTERNAL MEDICINE

## 2022-11-05 PROCEDURE — 2580000003 HC RX 258: Performed by: STUDENT IN AN ORGANIZED HEALTH CARE EDUCATION/TRAINING PROGRAM

## 2022-11-05 PROCEDURE — 2060000000 HC ICU INTERMEDIATE R&B

## 2022-11-05 PROCEDURE — 80048 BASIC METABOLIC PNL TOTAL CA: CPT

## 2022-11-05 PROCEDURE — 2709999900 HC NON-CHARGEABLE SUPPLY: Performed by: INTERNAL MEDICINE

## 2022-11-05 PROCEDURE — C9113 INJ PANTOPRAZOLE SODIUM, VIA: HCPCS | Performed by: STUDENT IN AN ORGANIZED HEALTH CARE EDUCATION/TRAINING PROGRAM

## 2022-11-05 PROCEDURE — 2500000003 HC RX 250 WO HCPCS: Performed by: FAMILY MEDICINE

## 2022-11-05 PROCEDURE — 3700000001 HC ADD 15 MINUTES (ANESTHESIA): Performed by: INTERNAL MEDICINE

## 2022-11-05 PROCEDURE — 2720000010 HC SURG SUPPLY STERILE: Performed by: INTERNAL MEDICINE

## 2022-11-05 PROCEDURE — 3609027000 HC COLONOSCOPY: Performed by: INTERNAL MEDICINE

## 2022-11-05 PROCEDURE — 6360000002 HC RX W HCPCS: Performed by: FAMILY MEDICINE

## 2022-11-05 PROCEDURE — 0DJD8ZZ INSPECTION OF LOWER INTESTINAL TRACT, VIA NATURAL OR ARTIFICIAL OPENING ENDOSCOPIC: ICD-10-PCS | Performed by: INTERNAL MEDICINE

## 2022-11-05 PROCEDURE — 6360000002 HC RX W HCPCS: Performed by: STUDENT IN AN ORGANIZED HEALTH CARE EDUCATION/TRAINING PROGRAM

## 2022-11-05 RX ORDER — LIDOCAINE HYDROCHLORIDE 20 MG/ML
INJECTION, SOLUTION EPIDURAL; INFILTRATION; INTRACAUDAL; PERINEURAL PRN
Status: DISCONTINUED | OUTPATIENT
Start: 2022-11-05 | End: 2022-11-05 | Stop reason: SDUPTHER

## 2022-11-05 RX ORDER — SODIUM CHLORIDE 9 MG/ML
INJECTION, SOLUTION INTRAVENOUS CONTINUOUS
Status: ACTIVE | OUTPATIENT
Start: 2022-11-05 | End: 2022-11-05

## 2022-11-05 RX ORDER — SODIUM CHLORIDE 9 MG/ML
INJECTION, SOLUTION INTRAVENOUS PRN
Status: DISCONTINUED | OUTPATIENT
Start: 2022-11-05 | End: 2022-11-06 | Stop reason: HOSPADM

## 2022-11-05 RX ORDER — PROPOFOL 10 MG/ML
INJECTION, EMULSION INTRAVENOUS CONTINUOUS PRN
Status: DISCONTINUED | OUTPATIENT
Start: 2022-11-05 | End: 2022-11-05 | Stop reason: SDUPTHER

## 2022-11-05 RX ADMIN — SODIUM CHLORIDE, PRESERVATIVE FREE 10 ML: 5 INJECTION INTRAVENOUS at 09:05

## 2022-11-05 RX ADMIN — OLANZAPINE 5 MG: 5 TABLET, FILM COATED ORAL at 21:03

## 2022-11-05 RX ADMIN — SODIUM CHLORIDE: 9 INJECTION, SOLUTION INTRAVENOUS at 00:38

## 2022-11-05 RX ADMIN — PROPOFOL 150 MCG/KG/MIN: 10 INJECTION, EMULSION INTRAVENOUS at 09:10

## 2022-11-05 RX ADMIN — PANTOPRAZOLE SODIUM 40 MG: 40 INJECTION, POWDER, LYOPHILIZED, FOR SOLUTION INTRAVENOUS at 21:03

## 2022-11-05 RX ADMIN — ACETAMINOPHEN 650 MG: 325 TABLET ORAL at 05:32

## 2022-11-05 RX ADMIN — SODIUM CHLORIDE, PRESERVATIVE FREE 5 ML: 5 INJECTION INTRAVENOUS at 21:03

## 2022-11-05 RX ADMIN — LIDOCAINE HYDROCHLORIDE 100 MG: 20 INJECTION, SOLUTION EPIDURAL; INFILTRATION; INTRACAUDAL; PERINEURAL at 09:10

## 2022-11-05 ASSESSMENT — PAIN DESCRIPTION - DESCRIPTORS: DESCRIPTORS: SORE;ACHING

## 2022-11-05 ASSESSMENT — PAIN DESCRIPTION - LOCATION: LOCATION: BACK

## 2022-11-05 ASSESSMENT — PAIN SCALES - GENERAL: PAINLEVEL_OUTOF10: 10

## 2022-11-05 ASSESSMENT — PAIN DESCRIPTION - ORIENTATION: ORIENTATION: LOWER;MID

## 2022-11-05 ASSESSMENT — PAIN SCALES - WONG BAKER: WONGBAKER_NUMERICALRESPONSE: 0

## 2022-11-05 NOTE — PLAN OF CARE
Problem: Discharge Planning  Goal: Discharge to home or other facility with appropriate resources  Outcome: Progressing  Flowsheets (Taken 11/5/2022 0729)  Discharge to home or other facility with appropriate resources:   Identify barriers to discharge with patient and caregiver   Arrange for needed discharge resources and transportation as appropriate     Problem: Pain  Goal: Verbalizes/displays adequate comfort level or baseline comfort level  Outcome: Progressing  Flowsheets  Taken 11/4/2022 2032 by Cynthia Rajput RN  Verbalizes/displays adequate comfort level or baseline comfort level: Encourage patient to monitor pain and request assistance  Taken 11/4/2022 1800 by Cynthia Rajput RN  Verbalizes/displays adequate comfort level or baseline comfort level: Encourage patient to monitor pain and request assistance     Problem: ABCDS Injury Assessment  Goal: Absence of physical injury  Outcome: Progressing  Flowsheets (Taken 11/4/2022 0618 by Leta Hill, RN)  Absence of Physical Injury: Implement safety measures based on patient assessment     Problem: Safety - Adult  Goal: Free from fall injury  Outcome: Progressing  Flowsheets (Taken 11/4/2022 0618 by Leta Hill, RN)  Free From Fall Injury:   Instruct family/caregiver on patient safety   Based on caregiver fall risk screen, instruct family/caregiver to ask for assistance with transferring infant if caregiver noted to have fall risk factors     Problem: Chronic Conditions and Co-morbidities  Goal: Patient's chronic conditions and co-morbidity symptoms are monitored and maintained or improved  Outcome: Progressing  Flowsheets (Taken 11/5/2022 0729)  Care Plan - Patient's Chronic Conditions and Co-Morbidity Symptoms are Monitored and Maintained or Improved:   Monitor and assess patient's chronic conditions and comorbid symptoms for stability, deterioration, or improvement   Collaborate with multidisciplinary team to address chronic and comorbid conditions and prevent exacerbation or deterioration   Update acute care plan with appropriate goals if chronic or comorbid symptoms are exacerbated and prevent overall improvement and discharge     Problem: Cardiovascular - Adult  Goal: Maintains optimal cardiac output and hemodynamic stability  Outcome: Progressing  Flowsheets (Taken 11/5/2022 0729)  Maintains optimal cardiac output and hemodynamic stability:   Monitor blood pressure and heart rate   Monitor urine output and notify Licensed Independent Practitioner for values outside of normal range     Problem: Cardiovascular - Adult  Goal: Absence of cardiac dysrhythmias or at baseline  Outcome: Progressing  Flowsheets (Taken 11/5/2022 0729)  Absence of cardiac dysrhythmias or at baseline:   Monitor cardiac rate and rhythm   Assess for signs of decreased cardiac output     Problem: Metabolic/Fluid and Electrolytes - Adult  Goal: Electrolytes maintained within normal limits  Outcome: Progressing  Flowsheets (Taken 11/5/2022 0729)  Electrolytes maintained within normal limits:   Monitor labs and assess patient for signs and symptoms of electrolyte imbalances   Administer electrolyte replacement as ordered   Monitor response to electrolyte replacements, including repeat lab results as appropriate     Problem: Metabolic/Fluid and Electrolytes - Adult  Goal: Hemodynamic stability and optimal renal function maintained  Outcome: Progressing  Flowsheets (Taken 11/5/2022 0729)  Hemodynamic stability and optimal renal function maintained:   Monitor labs and assess for signs and symptoms of volume excess or deficit   Monitor intake, output and patient weight   Monitor urine specific gravity, serum osmolarity and serum sodium as indicated or ordered     Problem: Metabolic/Fluid and Electrolytes - Adult  Goal: Glucose maintained within prescribed range  Outcome: Progressing  Flowsheets (Taken 11/5/2022 0729)  Glucose maintained within prescribed range:   Monitor blood glucose as ordered Assess for signs and symptoms of hyperglycemia and hypoglycemia

## 2022-11-05 NOTE — ANESTHESIA POSTPROCEDURE EVALUATION
Department of Anesthesiology  Postprocedure Note    Patient: Erlin Ruiz  MRN: [id number]  YOB: 1955  Date of evaluation: 11/5/2022      Procedure Summary     Date: 11/05/22 Room / Location: [address] [place] [address] Jean Claude Rangel  / [facility]    Anesthesia Start: 6316 Anesthesia Stop: 1020    Procedures:       COLONOSCOPY DIAGNOSTIC      EGD BIOPSY      EGD CONTROL HEMORRHAGE Diagnosis:       Gastrointestinal hemorrhage, unspecified gastrointestinal hemorrhage type      (GI bleed)    Surgeons: Eliza Winter MD Responsible Provider: Eleonora Shipman MD    Anesthesia Type: MAC ASA Status: 3          Anesthesia Type: No value filed.     Magno Phase I: Magno Score: 10    Magno Phase II: Magno Score: 10      Anesthesia Post Evaluation    Patient location during evaluation: PACU  Level of consciousness: awake  Complications: no  Multimodal analgesia pain management approach

## 2022-11-05 NOTE — PROGRESS NOTES
GI Progress Note    Milly Kumar is a 78 y.o. female patient. 1. Lower GI bleed    2.  Gastrointestinal hemorrhage, unspecified gastrointestinal hemorrhage type        Admit Date: 11/3/2022    Subjective:       No abdominal pain  Hg 9.7 this am      ROS:  Cardiovascular ROS: no chest pain or dyspnea on exertion  Gastrointestinal ROS: as abov  Respiratory ROS: no cough, shortness of breath, or wheezing    Scheduled Meds:   sodium chloride flush  5-40 mL IntraVENous 2 times per day    OLANZapine  10 mg Oral Nightly    pantoprazole  40 mg IntraVENous BID    insulin lispro  0-4 Units SubCUTAneous TID WC    insulin lispro  0-4 Units SubCUTAneous Nightly       Continuous Infusions:   sodium chloride 100 mL/hr at 11/05/22 0038    sodium chloride      sodium chloride      dextrose         PRN Meds:  sodium chloride, sodium chloride flush, sodium chloride, ondansetron **OR** ondansetron, polyethylene glycol, acetaminophen **OR** acetaminophen, glucose, dextrose bolus **OR** dextrose bolus, glucagon (rDNA), dextrose      Objective:       Patient Vitals for the past 24 hrs:   BP Temp Temp src Pulse Resp SpO2 Height Weight   11/05/22 1020 121/68 -- -- 75 18 99 % -- --   11/05/22 1010 (!) 107/55 97.2 °F (36.2 °C) Temporal 70 18 99 % -- --   11/05/22 0909 135/69 98.1 °F (36.7 °C) Temporal 75 18 99 % -- --   11/05/22 0806 122/62 97.8 °F (36.6 °C) Oral 74 18 97 % -- --   11/05/22 0509 123/75 97.9 °F (36.6 °C) Oral 72 18 93 % -- 178 lb 5.6 oz (80.9 kg)   11/05/22 0035 117/76 -- -- 76 18 -- -- --   11/04/22 2338 99/68 97.8 °F (36.6 °C) Oral 76 18 94 % -- --   11/04/22 2032 117/67 97.8 °F (36.6 °C) Oral 79 18 96 % -- --   11/04/22 1800 -- -- -- 85 18 98 % -- --   11/04/22 1514 132/86 98 °F (36.7 °C) Oral 80 19 97 % 5' 3\" (1.6 m) --   11/04/22 1220 133/89 98.3 °F (36.8 °C) Oral 79 18 -- -- --       Exam:    VITALS:  /68   Pulse 75   Temp 97.2 °F (36.2 °C) (Temporal)   Resp 18   Ht 5' 3\" (1.6 m)   Wt 178 lb 5.6 oz (80.9 kg)   SpO2 99%   BMI 31.59 kg/m²   TEMPERATURE:  Current - Temp: 97.2 °F (36.2 °C); Max - Temp  Av.9 °F (36.6 °C)  Min: 97.2 °F (36.2 °C)  Max: 98.3 °F (36.8 °C)    NAD  General appearance: alert, appears stated age, cooperative and no distress  Head: Normocephalic, without obvious abnormality, atraumatic  Neck: supple, symmetrical, trachea midline and thyroid not enlarged, symmetric, no tenderness/mass/nodules  CVS:  RRR, Nl s1s2  Lungs CTA Bilaterally, normal effort  Abdomen: soft, non-tender; bowel sounds normal; no masses,  no organomegaly  AAOx3, No asterixis or encephalopathy  Extremities: No edema. Lab Data:  Recent Labs     22  1158 22  1937 22  0203 22  0530   WBC 10.5 12.3*  --   --   --  7.7   HGB 11.5* 12.0   < > 10.3* 9.2* 9.7*   HCT 33.9* 35.4*   < > 30.2* 27.1* 28.7*   MCV 93.0 90.8  --   --   --  90.8    185  --   --   --  158    < > = values in this interval not displayed. Recent Labs     222 22  0530    139 143   K 4.0 4.3 3.8    105 111*   CO2 26 27 26   BUN 25* 23* 13   CREATININE 0.8 0.7 0.6     Recent Labs     22  1158   AST 43*   ALT 27   BILIDIR <0.2   BILITOT 0.5   ALKPHOS 65     No results for input(s): LIPASE, AMYLASE in the last 72 hours. Recent Labs     22  1158   PROT  --  6.2*   INR 1.07  --      No results for input(s): PTT in the last 72 hours. No results for input(s): OCCULTBLD in the last 72 hours. Assessment:       Principal Problem:    Acute GI bleeding  Resolved Problems:    * No resolved hospital problems.  *    DM  HTN  Hepatitis c      Recommendations:       Egd and colonoscopy this am    Pau Sandoval MD  2022  10:30 AM

## 2022-11-05 NOTE — PROGRESS NOTES
Physician Progress Note      Ashley Guadalupe  CSN #:                  [id number]  :                       1955  ADMIT DATE:       11/3/2022 11:24 PM  [address] [place] DATE:  Yemi Jaki  PROVIDER #:        Carmen Saunders MD          QUERY TEXT:    Pt admitted with GI bleed and has anemia documented. If possible, please   document in progress notes and discharge summary further specificity regarding   the acuity and type of anemia:      The medical record reflects the following:  Risk Factors: Bleeding AVM's  Clinical Indicators:  H/H 12.0/35.2   9.7/28.7 Per ED PN: Azra Rondon is a 78 y.o. female presenting with a complaint of bright and dark   red blood per rectum and hypotension. On arrival, 2 IV access points were   established and fluids from EMS were finished for we are awaiting emergent   blood. Her ongoing hemodynamic instability dictated the need for emergent   uncrossed matched blood. 1 unit of O- blood was given to the patient with   subsequent improvement in her hemodynamics. \"  Treatment: GI consult, Monitor H/H, Control of bleeding with Ablation, PRBC's   transfusion  Options provided:  -- Anemia due to acute blood loss  -- Anemia due to acute on chronic blood loss  -- Other - I will add my own diagnosis  -- Disagree - Not applicable / Not valid  -- Disagree - Clinically unable to determine / Unknown  -- Refer to Clinical Documentation Reviewer    PROVIDER RESPONSE TEXT:    This patient has acute blood loss anemia. Query created by: Jenna Andrew on 2022 11:29 AM      QUERY TEXT:    Pt admitted with Rectal bleeding. Pt noted to have hemodynamic instability on   arrival to ED . If possible, please document in the progress notes and   discharge summary if you are evaluating and/or treating any of the following:       The medical record reflects the following:  Risk Factors: Rectal bleeding, Angiodysplasia with bleeding  Clinical Indicators: Per ED PN: \" Was initially awake and alert became more   hypotensive and lethargic in route. Here, she is moaning complaining of lower   abdominal pain, but otherwise minimally coherent: On arrival, 2 IV access   points were established and fluids from EMS were finished for we are awaiting   emergent blood. Her ongoing hemodynamic instability dictated the need for   emergent uncrossed matched blood. 1 unit of O- blood was given to the patient   with subsequent improvement in her hemodynamics. \" B/P 72/50  ER Nurse PN: \"Pt   is lethargic, alert and oriented x 4.  Per   Treatment: Emergent uncrossed matched blood transfusion of 0-blood, Lab   monitoring, oxygen 8L, 2 large bore IV's  Options provided:  -- Hypovolemic Shock due to blood loss, now resolving  -- Other - I will add my own diagnosis  -- Disagree - Not applicable / Not valid  -- Disagree - Clinically unable to determine / Unknown  -- Refer to Clinical Documentation Reviewer    PROVIDER RESPONSE TEXT:    Hypotension due to ABLA, not shock as did not require pressers and improved   with IVF    Query created by: Issa Tam on 11/5/2022 11:54 AM      Electronically signed by:  Sloane Nicole MD 11/5/2022 12:39 PM

## 2022-11-05 NOTE — PROGRESS NOTES
Pt was taking GoLytely overnight in preparation for egd and colonoscopy today. Pt had multiple episodes of watery, bloody stool. Pt was unable to control bowel. Pt was leaking blood from watery bloody stool from rectum into brief while at rest and would drain out when should stood up to transfer to the commode. I notified the team at around midnight. We stopped the GoLytely, performed stat H&H, continued fluids of NS at 100mL. Pt BP was borderline soft and HR in 70's. Pt remained A & O x4 and was asymptomatic. Blood was ordered and held pending repeat H&H this morning. Report was given to the dayshift charge nurse Namrata.

## 2022-11-05 NOTE — PROGRESS NOTES
EGD: several bleeding AVM's  . All ablated with APC    Colonoscopy: black stool noted in right colon and transverse secondary to bleeding gastric avm's. However maroon/red blood noted in left colon suggestive of concurrent bleed . However no active bleed noted. . Quality of prep was not good. Will observe closely. Keep npo. Continue iv hydration and protonix.   Discussed findings with daughter    Antonio Parmjitklaus acde  11-5-22

## 2022-11-05 NOTE — CONSENT
Informed Consent for Blood Component Transfusion Note    I have discussed with the patient the rationale for blood component transfusion; its benefits in treating or preventing fatigue, organ damage, or death; and its risk which includes mild transfusion reactions, rare risk of blood borne infection, or more serious but rare reactions. I have discussed the alternatives to transfusion, including the risk and consequences of not receiving transfusion. The patient had an opportunity to ask questions and had agreed to proceed with transfusion of blood components.     Electronically signed by Erik Green MD on 11/5/22 at 1:52 AM EDT

## 2022-11-05 NOTE — PROGRESS NOTES
I have seen, examined and evaluated the patient as did the resident physician. We have discussed the plan of care and decisions made during that discussion were incorporated into this note. I have reviewed the resident physician's note and agree with the assessment and plan of care as documented. 60-year-old AAF with history of bipolar 1 disorder, hypertension, non-insulin-dependent diabetes  -Regarding her GI history, hx of HCV Genotype 1b disease (Risk factor for alethea HCV was blood transfusion in 1970's), seen by Dr. Ada Henderson in 11/2018 for melana episodes, and underwent EGD on 10/31/2019 but unable to find report, but per review of chart she was diagnosed with esophageal varices which did not require banding, she was found to have gastritis came back positive for H. pylori and treated with quadruple therapy. In year 2020 she was treated with Epclusa for 12 weeks without Ribavarin as had F4 fibrosis based on FIBRO scan as well as endoscopic evidence of esophageal varices. - admitted 11/04 am with hematochezia and yovany. Seen by gastroenterology in the hospital, she was started on GoLytely prep. November 4 night/November 5 morning while taking the colonoscopy prep there were significant amount of bright red blood per rectum which is estimated by medicine resident to be at least greater than 500 mL. Hemoglobin was down to 10.3 from 11.5 on admission and patient was given 1 unit packed RBC    I saw the patient after endoscopic evaluation, I discussed with Dr. Sheeba Sood patient did not have any esophageal varices on his evaluation, he strong multiple bleeding gastric AVMs, he also noted on colonoscopy maroon/red blood in the left colon which could be a second site of bleeding.   Patient will be patient hemodynamically stable, continue IV hydration, will allow liquid diet, Protonix 40 mg twice daily  Monitor for any bleeding    Although ultrasound says hepatomegaly, she has known chronic liver disease and needs [facility]    This was discussed with patient and family  Discussed care with patient and family    ADULT DIET;  Clear Liquid  ADULT ORAL NUTRITION SUPPLEMENT; AM Snack, PM Snack, HS Snack; Standard High Calorie/High Protein Oral Supplement  Full Code    Samuel Rodríguez MD  Hospitalist  Attending Physician

## 2022-11-05 NOTE — PROGRESS NOTES
Internal Medicine PGY- 2 Resident Progress Note    PCP: Aníbal Hodge MD    Date of Admission: 11/3/2022    Chief Complaint: Rectal bleeding associated with left sided abdominal pain 11/03/22    Interval history:   The patient was reported to have bright red blood per rectum mixed with stools last night that progressed to bloody output estimated to be around 500 mL. The patient remained hemodynamically stable and asymptomatic. Repeat hemoglobin was stable. She underwent EGD and colonoscopy, EGD revealed several bleeding AVMs that were ablated. Black stool was noted in the right colon and the transverse colon however the quality of prep for colonoscopy was not deemed appropriate. Medications:  Reviewed    Infusion Medications    sodium chloride 100 mL/hr at 11/05/22 0038    sodium chloride      sodium chloride      dextrose       Scheduled Medications    sodium chloride flush  5-40 mL IntraVENous 2 times per day    OLANZapine  10 mg Oral Nightly    pantoprazole  40 mg IntraVENous BID    insulin lispro  0-4 Units SubCUTAneous TID WC    insulin lispro  0-4 Units SubCUTAneous Nightly     PRN Meds: sodium chloride, sodium chloride flush, sodium chloride, ondansetron **OR** ondansetron, polyethylene glycol, acetaminophen **OR** acetaminophen, glucose, dextrose bolus **OR** dextrose bolus, glucagon (rDNA), dextrose      Intake/Output Summary (Last 24 hours) at 11/5/2022 1141  Last data filed at 11/4/2022 2338  Gross per 24 hour   Intake 240 ml   Output 250 ml   Net -10 ml       Physical Exam Performed:    /70   Pulse 73   Temp 97.2 °F (36.2 °C) (Temporal)   Resp 16   Ht 5' 3\" (1.6 m)   Wt 178 lb 5.6 oz (80.9 kg)   SpO2 99%   BMI 31.59 kg/m²     General appearance: No apparent distress, appears stated age and cooperative. HEENT: Pupils equal, round, and reactive to light. Respiratory:  Normal respiratory effort. Clear to auscultation, bilaterally without Rales/Wheezes/Rhonchi.   Cardiovascular: Regular rate and rhythm with normal S1/S2 without murmurs, rubs or gallops. Abdomen: Soft, non-tender, non-distended with normal bowel sounds. Musculoskeletal: No clubbing, cyanosis or edema bilaterally. Neurologic:  Neurovascularly intact without any focal sensory/motor deficits. Cranial nerves: II-XII intact, grossly non-focal.  Psychiatric: Alert and oriented, thought content appropriate, normal insight  Peripheral Pulses: +2 palpable, equal bilaterally     Labs:   Recent Labs     11/03/22  2346 11/04/22  0441 11/04/22  1158 11/04/22  1937 11/05/22  0203 11/05/22  0530   WBC 10.5 12.3*  --   --   --  7.7   HGB 11.5* 12.0   < > 10.3* 9.2* 9.7*   HCT 33.9* 35.4*   < > 30.2* 27.1* 28.7*    185  --   --   --  158    < > = values in this interval not displayed. Recent Labs     11/03/22  2346 11/04/22  0442 11/05/22  0530    139 143   K 4.0 4.3 3.8    105 111*   CO2 26 27 26   BUN 25* 23* 13   CREATININE 0.8 0.7 0.6   CALCIUM 8.8 9.1 8.5     Recent Labs     11/04/22  1158   AST 43*   ALT 27   BILIDIR <0.2   BILITOT 0.5   ALKPHOS 65     Recent Labs     11/03/22  2346   INR 1.07     No results for input(s): Alicia Greene in the last 72 hours. Urinalysis:    No results found for: Stuart Vines, BACTERIA, RBCUA, BLOODU, [address], [address]    Radiology:  US LIVER   Final Result      Suspect hepatomegaly, no evidence for mass      CTA ABDOMEN PELVIS [address]   Final Result      This exam is severely degraded by motion making it impossible to determine if there is contrast extravasation into the bowel lumen. Evaluation with nuclear medicine tagged red blood cell imaging may be helpful. Assessment/Plan:    Ramboy Beams, 78 y.o. female w/ past medical history of hypertension, diabetes. Admitted for evaluation of rectal bleeding. Suspicion for AVMs in the upper GI tract as etiology.   Active Hospital Problems    Diagnosis Date Noted    Acute GI bleeding [K92.2] 11/04/2022     Priority: Medium     Acute anemia due to suspected upper and lower GI bleed    -Patient's presentation with dark stools and symptomatic anemia associated with dizziness on arrival.    -Consulted Dr. Xin Rios MD for GI workup.     -Baseline hemoglobin in 14 g/dl range, progressive decline in hemoglobin to 9.7 this a.m. found to have bleeding source from GI tract.    -She underwent EGD that revealed several bleeding AVMs ablated with APC and colonoscopy that was concerning for a concurrent bleed however the prep was inadequate. Plan:  Continue to keep patient NPO. Continue IV hydration and Protonix twice daily. Continue trending H&H every 8 hourly. 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 Per records the patient completed her treatment for HCV 1B with Epclusa x 12 week. She has history of esophageal varices proved endoscopically. Unfortunately, the patient was lost to follow-up. Viral for hep C currently in process. Right upper quadrant ultrasound shows findings of cirrhosis but does not show any concerning mass. We will arrange for follow-up with her gastroenterologist outpatient at discharge. Type 2 diabetes mellitus  On metformin 500 mg twice daily at home, held inpatient. BG in control, Patient has been n.p.o. On low-dose sliding scale insulin. Hypoglycemia protocol ordered. Memory issues  Schizohrenia  Continue home medications    Hypertension:  Not on any medication at home. Blood pressures have been either on the softer side or within normal limits during this hospitalization.     DVT Prophylaxis: SCD  Diet: Diet NPO  Code Status: Full Code    Discussed the patient with MD Simone Dong MD, MD  Internal Medicine Resident PGY-2  Contact via [facility]

## 2022-11-05 NOTE — ANESTHESIA PRE PROCEDURE
Department of Anesthesiology  Preprocedure Note       Name:  Farhana Herrera   Age:  78 y.o.  :  1955                                          MRN:  [id number]         Date:  2022      Surgeon: Nerissa Smith):  Indy Lozoya MD    Procedure: Procedure(s):  EGD DIAGNOSTIC ONLY  COLONOSCOPY DIAGNOSTIC    Medications prior to admission:   Prior to Admission medications    Medication Sig Start Date End Date Taking? Authorizing Provider   acetaminophen (TYLENOL) 500 MG tablet Take 1 tablet by mouth 4 times daily as needed for Pain 10/10/22   Krystal Marie MD   ibuprofen (ADVIL;MOTRIN) 600 MG tablet Take 1 tablet by mouth 4 times daily as needed for Pain 10/10/22   Krystal Marie MD   OLANZapine (ZYPREXA) 10 MG tablet Take 10 mg by mouth nightly    Historical Provider, MD   metFORMIN (GLUCOPHAGE) 500 MG tablet Take 500 mg by mouth 2 times daily (with meals). Historical Provider, MD   diphenhydrAMINE (SOMINEX) 25 MG tablet Take 25 mg by mouth nightly as needed for Sleep. Patient not taking: Reported on 2022    Historical Provider, MD   naproxen sodium (ANAPROX DS) 550 MG tablet Take 1 tablet by mouth 2 times daily (with meals) for 14 doses.  13  Shahab Chan MD       Current medications:    Current Facility-Administered Medications   Medication Dose Route Frequency Provider Last Rate Last Admin    0.9 % sodium chloride infusion   IntraVENous Continuous Shemar Cabrales  mL/hr at 22 0038 New Bag at 22 0038    0.9 % sodium chloride infusion   IntraVENous PRN Shemar Cabrales MD        sodium chloride flush 0.9 % injection 5-40 mL  5-40 mL IntraVENous 2 times per day Shemar Cabrales MD   10 mL at 22    sodium chloride flush 0.9 % injection 5-40 mL  5-40 mL IntraVENous PRN Shemar Cabrales MD        0.9 % sodium chloride infusion   IntraVENous PRN Shemar Cabrales MD        ondansetron (ZOFRAN-ODT) disintegrating tablet 4 mg  4 mg Oral Q8H PRN [facility] Remberto Tong MD        Or    [facility]ron [facility] injection 4 mg  4 mg IntraVENous Q6H PRN Shemar Cabrales MD        polyethylene glycol [facility]) packet 17 g  17 g Oral Daily PRN Shemar Cabrales MD        acetaminophen (TYLENOL) tablet 650 mg  650 mg Oral Q6H PRN Shemar Cabrales MD   650 mg at 11/05/22 0532    Or    acetaminophen (TYLENOL) suppository 650 mg  650 mg Rectal Q6H PRN Shemar Cabrales MD        OLANZapine [facility]) tablet 10 mg  10 mg Oral Nightly Shemar Cabrales MD   10 mg at 11/04/22 2040    pantoprazole (PROTONIX) injection 40 mg  40 mg IntraVENous BID Shemar Cabrales MD   40 mg at 11/04/22 2041    glucose chewable tablet 16 g  4 tablet Oral PRN Shemar Cabrales MD        dextrose bolus 10% 125 mL  125 mL IntraVENous PRN Shemar Cabrales MD        Or    dextrose bolus 10% 250 mL  250 mL IntraVENous PRN Shemar Cabrales MD        glucagon (rDNA) injection 1 mg  1 mg SubCUTAneous PRN Shemar Cabrales MD        dextrose 10 % infusion   IntraVENous Continuous MAXIMN Shemar Cabrales MD        insulin lispro (1 Unit Dial) (HUMALOG/ADMELOG) pen 0-4 Units  0-4 Units SubCUTAneous TID [facility] Shemar Cabrales MD        insulin lispro (1 Unit Dial) (HUMALOG/ADMELOG) pen 0-4 Units  0-4 Units SubCUTAneous Nightly Shemar Cabrales MD           Allergies: Allergies   Allergen Reactions    Codeine Nausea And Vomiting       Problem List:    Patient Active Problem List   Diagnosis Code    Acute GI bleeding K92.2       Past Medical History:        Diagnosis Date    Diabetes mellitus ([facility] [address]     Hypertension     Stroke [facility])        Past Surgical History:  History reviewed. No pertinent surgical history.     Social History:    Social History     Tobacco Use    Smoking status: Every Day     Packs/day: 0.50     Years: 40.00     Pack years: 20.00     Types: Cigarettes    Smokeless tobacco: Never   Substance Use Topics    Alcohol use: Yes     Comment: beer occasionally Ready to quit: Not Answered  Counseling given: Not Answered      Vital Signs (Current):   Vitals:    11/04/22 2338 11/05/22 0035 11/05/22 0509 11/05/22 0806   BP: 99/68 117/76 123/75 122/62   Pulse: 76 76 72 74   Resp: 18 18 18 18   Temp: 97.8 °F (36.6 °C)  97.9 °F (36.6 °C) 97.8 °F (36.6 °C)   TempSrc: Oral  Oral Oral   SpO2: 94%  93% 97%   Weight:   178 lb 5.6 oz (80.9 kg)    Height:                                                  BP Readings from Last 3 Encounters:   11/05/22 122/62   10/10/22 (!) 160/98   09/25/19 135/72       NPO Status:                                                                                 BMI:   Wt Readings from Last 3 Encounters:   11/05/22 178 lb 5.6 oz (80.9 kg)   10/10/22 175 lb (79.4 kg)   05/17/21 182 lb (82.6 kg)     Body mass index is 31.59 kg/m².     CBC:   Lab Results   Component Value Date/Time    WBC 7.7 11/05/2022 05:30 AM    RBC 3.16 11/05/2022 05:30 AM    HGB 9.7 11/05/2022 05:30 AM    HCT 28.7 11/05/2022 05:30 AM    MCV 90.8 11/05/2022 05:30 AM    RDW 13.9 11/05/2022 05:30 AM     11/05/2022 05:30 AM       CMP:   Lab Results   Component Value Date/Time     11/05/2022 05:30 AM    K 3.8 11/05/2022 05:30 AM     11/05/2022 05:30 AM    CO2 26 11/05/2022 05:30 AM    BUN 13 11/05/2022 05:30 AM    CREATININE 0.6 11/05/2022 05:30 AM    GFRAA >60 09/25/2019 12:55 PM    LABGLOM >60 11/05/2022 05:30 AM    GLUCOSE 99 11/05/2022 05:30 AM    PROT 6.2 11/04/2022 11:58 AM    CALCIUM 8.5 11/05/2022 05:30 AM    BILITOT 0.5 11/04/2022 11:58 AM    ALKPHOS 65 11/04/2022 11:58 AM    AST 43 11/04/2022 11:58 AM    ALT 27 11/04/2022 11:58 AM       POC Tests:   Recent Labs     11/05/22  0844   POCGLU 106*       Coags:   Lab Results   Component Value Date/Time    PROTIME 13.8 11/03/2022 11:46 PM    INR 1.07 11/03/2022 11:46 PM    APTT 26.6 11/03/2022 11:46 PM       HCG (If Applicable): No results found for: PREGTESTUR, PREGSERUM, HCG, HCGQUANT     ABGs: No results found for: PHART, PO2ART, FIA9ACD, EDE6LPT, BEART, Y9SHCUSN     Type & Screen (If Applicable):  No results found for: LABABO, LABRH    Drug/Infectious Status (If Applicable):  No results found for: HIV, HEPCAB    COVID-19 Screening (If Applicable): No results found for: COVID19        Anesthesia Evaluation    Airway: Mallampati: II  TM distance: >3 FB   Neck ROM: full  Mouth opening: > = 3 FB   Dental:          Pulmonary:                              Cardiovascular:    (+) hypertension:,         Rhythm: regular  Rate: normal                    Neuro/Psych:   (+) CVA:,             GI/Hepatic/Renal:             Endo/Other:    (+) Diabetes, . Abdominal:             Vascular: Other Findings:           Anesthesia Plan      MAC     ASA 3       Induction: intravenous. Anesthetic plan and risks discussed with patient. Plan discussed with CRNA.                     Meseret Lyles MD   11/5/2022

## 2022-11-06 VITALS
HEART RATE: 79 BPM | SYSTOLIC BLOOD PRESSURE: 146 MMHG | TEMPERATURE: 98.2 F | WEIGHT: 178.13 LBS | RESPIRATION RATE: 18 BRPM | OXYGEN SATURATION: 98 % | BODY MASS INDEX: 31.56 KG/M2 | HEIGHT: 63 IN | DIASTOLIC BLOOD PRESSURE: 77 MMHG

## 2022-11-06 PROBLEM — D62 ACUTE BLOOD LOSS ANEMIA: Status: ACTIVE | Noted: 2022-11-06

## 2022-11-06 LAB
ANION GAP SERPL CALCULATED.3IONS-SCNC: 6 MMOL/L (ref 3–16)
BUN BLDV-MCNC: 8 MG/DL (ref 7–20)
CALCIUM SERPL-MCNC: 8.5 MG/DL (ref 8.3–10.6)
CHLORIDE BLD-SCNC: 107 MMOL/L (ref 99–110)
CO2: 26 MMOL/L (ref 21–32)
CREAT SERPL-MCNC: 0.6 MG/DL (ref 0.6–1.2)
GFR SERPL CREATININE-BSD FRML MDRD: >60 ML/MIN/{1.73_M2}
GLUCOSE BLD-MCNC: 135 MG/DL (ref 70–99)
HCT VFR BLD CALC: 20.7 % (ref 36–48)
HCT VFR BLD CALC: 25.7 % (ref 36–48)
HEMOGLOBIN: 7.1 G/DL (ref 12–16)
HEMOGLOBIN: 9 G/DL (ref 12–16)
POTASSIUM REFLEX MAGNESIUM: 3.7 MMOL/L (ref 3.5–5.1)
SODIUM BLD-SCNC: 139 MMOL/L (ref 136–145)

## 2022-11-06 PROCEDURE — 85018 HEMOGLOBIN: CPT

## 2022-11-06 PROCEDURE — 80048 BASIC METABOLIC PNL TOTAL CA: CPT

## 2022-11-06 PROCEDURE — 51798 US URINE CAPACITY MEASURE: CPT

## 2022-11-06 PROCEDURE — 6360000002 HC RX W HCPCS: Performed by: STUDENT IN AN ORGANIZED HEALTH CARE EDUCATION/TRAINING PROGRAM

## 2022-11-06 PROCEDURE — 2580000003 HC RX 258: Performed by: STUDENT IN AN ORGANIZED HEALTH CARE EDUCATION/TRAINING PROGRAM

## 2022-11-06 PROCEDURE — 6360000002 HC RX W HCPCS: Performed by: INTERNAL MEDICINE

## 2022-11-06 PROCEDURE — 36415 COLL VENOUS BLD VENIPUNCTURE: CPT

## 2022-11-06 PROCEDURE — 36430 TRANSFUSION BLD/BLD COMPNT: CPT

## 2022-11-06 PROCEDURE — 2580000003 HC RX 258

## 2022-11-06 PROCEDURE — 2580000003 HC RX 258: Performed by: INTERNAL MEDICINE

## 2022-11-06 PROCEDURE — 6370000000 HC RX 637 (ALT 250 FOR IP): Performed by: STUDENT IN AN ORGANIZED HEALTH CARE EDUCATION/TRAINING PROGRAM

## 2022-11-06 PROCEDURE — 85014 HEMATOCRIT: CPT

## 2022-11-06 PROCEDURE — C9113 INJ PANTOPRAZOLE SODIUM, VIA: HCPCS | Performed by: STUDENT IN AN ORGANIZED HEALTH CARE EDUCATION/TRAINING PROGRAM

## 2022-11-06 RX ORDER — 0.9 % SODIUM CHLORIDE 0.9 %
500 INTRAVENOUS SOLUTION INTRAVENOUS ONCE
Status: COMPLETED | OUTPATIENT
Start: 2022-11-06 | End: 2022-11-06

## 2022-11-06 RX ORDER — PANTOPRAZOLE SODIUM 40 MG/1
40 TABLET, DELAYED RELEASE ORAL 2 TIMES DAILY
Qty: 30 TABLET | Refills: 0 | Status: SHIPPED | OUTPATIENT
Start: 2022-11-06

## 2022-11-06 RX ORDER — SODIUM CHLORIDE 9 MG/ML
INJECTION, SOLUTION INTRAVENOUS PRN
Status: DISCONTINUED | OUTPATIENT
Start: 2022-11-06 | End: 2022-11-06 | Stop reason: HOSPADM

## 2022-11-06 RX ADMIN — IRON SUCROSE 200 MG: 20 INJECTION, SOLUTION INTRAVENOUS at 09:38

## 2022-11-06 RX ADMIN — SODIUM CHLORIDE, PRESERVATIVE FREE 10 ML: 5 INJECTION INTRAVENOUS at 08:42

## 2022-11-06 RX ADMIN — SODIUM CHLORIDE 500 ML: 9 INJECTION, SOLUTION INTRAVENOUS at 00:47

## 2022-11-06 RX ADMIN — ACETAMINOPHEN 650 MG: 325 TABLET ORAL at 00:19

## 2022-11-06 RX ADMIN — PANTOPRAZOLE SODIUM 40 MG: 40 INJECTION, POWDER, LYOPHILIZED, FOR SOLUTION INTRAVENOUS at 08:41

## 2022-11-06 ASSESSMENT — PAIN DESCRIPTION - FREQUENCY: FREQUENCY: INTERMITTENT

## 2022-11-06 ASSESSMENT — PAIN DESCRIPTION - LOCATION
LOCATION: ABDOMEN
LOCATION: ABDOMEN

## 2022-11-06 ASSESSMENT — PAIN DESCRIPTION - ORIENTATION: ORIENTATION: MID

## 2022-11-06 ASSESSMENT — PAIN DESCRIPTION - ONSET: ONSET: ON-GOING

## 2022-11-06 ASSESSMENT — PAIN DESCRIPTION - DESCRIPTORS: DESCRIPTORS: ACHING

## 2022-11-06 ASSESSMENT — PAIN SCALES - GENERAL
PAINLEVEL_OUTOF10: 3
PAINLEVEL_OUTOF10: 0

## 2022-11-06 ASSESSMENT — PAIN - FUNCTIONAL ASSESSMENT: PAIN_FUNCTIONAL_ASSESSMENT: PREVENTS OR INTERFERES SOME ACTIVE ACTIVITIES AND ADLS

## 2022-11-06 ASSESSMENT — PAIN DESCRIPTION - PAIN TYPE: TYPE: ACUTE PAIN

## 2022-11-06 NOTE — DISCHARGE SUMMARY
INTERNAL MEDICINE DEPARTMENT AT [address] [place] [address]  DISCHARGE SUMMARY    Patient ID: Rima Vera                                             Discharge Date: 11/6/2022   Patient's PCP: Jorge Basurto MD                                          Discharge Physician: Vianca Dover MD MD  Admit Date: 11/3/2022   Admitting Physician: Rehana Ghosh DO    PROBLEMS DURING HOSPITALIZATION:  Present on Admission:   Acute GI bleeding   Acute blood loss anemia      DISCHARGE DIAGNOSES:    HPI:78year-old female with a past medical history of hypertension diabetes, stroke, schizophrenia in around 1993 came to [facility] ED due to complaint complaints of blood in stool. Patient reported that she had 3 episodes of blood in stool where she noticed her stool to be dark in color and felt dizzy after the second episode. This was the first time that the patient noticed this. Patient did not report any hematemesis or hemoptysis. She has never had a colonoscopy done. Patient reports no history of ulcers or H. pylori infection. Did not report nausea vomiting or diarrhea, but did report constipation. Patient did mention that she has been taking ibuprofen 600 mg almost daily for 2 to 3 weeks that she was prescribed for right toe pain. She mentioned she was prescribed ibuprofen 600 mg twice daily if needed but she has not required that often. Patient did report some chest discomfort with associated shortness of breath but she attributed that to her history of smoking, reported no other symptoms, has no other concerns or complaints. In the ED: patient underwent MANDY that showed kj blood and was also given a unit of PRBCs. On the medical Floor: The patient underwent an extensive work up for her anemia. Given her physical exam including positive blood on MANDY, labs significant for microcytic anemia, hx of cirrhosis, pt underwent EGD and colonscopy.  The former was evident for AVMs which was treated with APCs and colonoscopy was significant for concurrent bleeding but no exact source was identified due to poor bowel prep. On the day of discharge, patient was hemodynamic stable, improvement in Hgb status with blood transfusion, physical exam including MANDY negative for active the patient was deemed medically stable. We did consider patient might benefit from staying at the hospital for observation however patient, having full capacity to make decision, requested to be discharged while understanding the risks of  returning home which includes but not restricted to bleeding leading to syncope and even death. She understood the consequences of her leaving and agreed to keep a close eye on the warned symptoms and agreed to return to ED if any concerning symptoms occur. She will follow up with her GI doctor at [facility] for further management and also will see her PCP within 1 weeks times. She is being discharged on protonix BID. Gastroenterologist, Dr. Ashanti Hess MD did recommend in case of any future bleed, CTA can be considered with IR consult for embolization of the source. The following issues were addressed during hospitalization:  Acute anemia due to suspected upper and lower GI bleed  -On protonix   -Underwent EGD and colonoscopy and APC ablation of AVMs  -transfused 1 unit of blood   -On IV venofer   Physical Exam:  Physical Exam  Constitutional:       Appearance: She is not ill-appearing. HENT:      Head: Normocephalic and atraumatic. Nose: Nose normal.      Mouth/Throat:      Mouth: Mucous membranes are dry. Cardiovascular:      Rate and Rhythm: Normal rate and regular rhythm. Pulses: Normal pulses. Pulmonary:      Effort: Pulmonary effort is normal. No respiratory distress. Breath sounds: Normal breath sounds. No stridor. No wheezing, rhonchi or rales. Chest:      Chest wall: No tenderness. Abdominal:      General: Bowel sounds are normal. There is no distension. Palpations: Abdomen is soft.       Tenderness: There is no abdominal tenderness. There is no right CVA tenderness, left CVA tenderness, guarding or rebound. Comments: MANDY significant for small clots of blood    Musculoskeletal:         General: No swelling or deformity. Cervical back: Normal range of motion. Right lower leg: No edema. Left lower leg: No edema. Skin:     General: Skin is dry. Capillary Refill: Capillary refill takes less than 2 seconds. Coloration: Skin is not jaundiced or pale. Findings: No bruising, erythema or lesion. Neurological:      General: No focal deficit present. Mental Status: She is alert and oriented to person, place, and time. Mental status is at baseline. Psychiatric:         Mood and Affect: Mood normal.        Consults: GI  Significant Diagnostic Studies:  EGD and colonoscopy   Treatments: venofer, blood transfusion,APCs  Disposition: home  Discharged Condition: Stable  Follow Up: Primary Care Physician in one week    DISCHARGE MEDICATION:       Medication List        START taking these medications      pantoprazole 40 MG tablet  Commonly known as: PROTONIX  Take 1 tablet by mouth 2 times daily            CONTINUE taking these medications      acetaminophen 500 MG tablet  Commonly known as: TYLENOL  Take 1 tablet by mouth 4 times daily as needed for Pain     metFORMIN 500 MG tablet  Commonly known as: GLUCOPHAGE     OLANZapine 10 MG tablet  Commonly known as: ZYPREXA            STOP taking these medications      diphenhydrAMINE 25 MG tablet  Commonly known as: SOMINEX     ibuprofen 600 MG tablet  Commonly known as: ADVIL;MOTRIN     naproxen sodium 550 MG tablet  Commonly known as:  Anaprox DS               Where to Get Your Medications        These medications were sent to [address] [place] [address], [address] [place] [address] [address] [place]      Phone: [phone]   pantoprazole 40 MG tablet          Activity: activity as tolerated  Diet: regular diet  Wound Care: none needed    Time Spent on discharge is more than 20 minutes    Signed:  Erika Evans MD,  PGY-1  11/6/2022

## 2022-11-06 NOTE — OP NOTE
[facility] [address], [address]                                OPERATIVE REPORT    PATIENT NAME: Cammie Dash                    :        1955  MED REC NO:   [id number]                          ROOM:       [id number]  ACCOUNT NO:   [de-identified]                           ADMIT DATE: 2022  PROVIDER:     Yunier Ryan MD    DATE OF PROCEDURE:  2022    SURGEON:  Yunier Ryan MD    INDICATION FOR PROCEDURE:  Gastrointestinal bleed. OPERATIVE PROCEDURE:  With the patient in the left lateral position and  after IV Diprivan, the Olympus video endoscope was introduced into the  esophagus and advanced towards the stomach. The esophagus was normal.   The stomach was carefully inspected. Several angiodysplastic lesions  were noted in the stomach. One of the most was fairly large and  actively bleeding. It was ablated using argon plasma coagulator. The  rest of them were also ablated with argon plasma coagulator and most of  them were actively bleeding at the time of the procedure. We achieved  complete hemostasis after APC ablation. The duodenum was normal.   Coagulator was removed and biopsy was obtained for Helicobacter pylori. There was no ulcer or upper GI neoplasm. Procedure was terminated  without complication. COLONOSCOPY:  The Olympus video colonoscope was then inserted into the  rectum and carefully advanced to the cecum. The colon unfortunately was  not well prepared as the medical resident decided to stop the prep last  night. We washed the colon aggressively during the procedure. There  was black stool noted in the cecum on the right side of the colon and  the transverse colon. It was completely washed. No active bleeding  noted. On the left side of the colon, there was maroonish/reddish  colored stool that we washed aggressively.   This indicated maybe there  possibly is bleeding on the left side of the colon; however, after  aggressive washing, we were unable to find the bleeding point. No other  abnormality was seen although the quality of the prep made it difficult  to ideally inspect the colon. However, no active bleeding was noted at  the time of the procedure. The scope was then removed without  complication. IMPRESSION:  1. Several bleeding angiodysplasia of the stomach. APC ablation was  performed. 2.  Minor gastritis. 3.  Possible concurrent bleeding from the left side of the colon. At  the time of procedure, no bleeding point was seen. Please see above. Ebl mild    The patient would be closely observed. We will continue Protonix IV and  IV hydration. If active lower GI bleed occurred, CTA will be indicated.         Esperanza Sam MD    D: 11/05/2022 10:34:39       T: 11/05/2022 10:57:28     [email or web address]  Job#: [id number]     Doc#: [id number]    CC:

## 2022-11-06 NOTE — PLAN OF CARE
Patient: Nikia Palacios Date: 2020   : 1962    57 year old female      OUTPATIENT WOUND CARE PROGRESS NOTE    Supervising Wound Care / Hyperbaric Medicine Physician: Not Applicable  Consulting Provider:  AARON Washington  Date of Consultation/Last Comprehensive Exam:  1/3/18  Referring  Provider:  self    SUBJECTIVE:    Chief Complaint:  Chronic right ankle wound    Wound/Ulcer Present:    Venous leg ulcer:  Current Vascular Assessment:  Physical exam and Venous insufficiency study.  Current Venous Type:  Venous insufficiency ulcer, lower extremity right ankle    Has the patient received adequate compression therapy for equal to or greater than 4 weeks?  Yes:  Viscopaste/cotton/Coban wraps      Additional Wound Category:  None     Maximum Baseline Ambulatory Status:  Ambulates unassisted    History of Present Illness:  This is a 57 year oldnon-diabetic female with medical history of a right hip replacement 8/10/17. On 8/15/17 She developed bilateral PE and right DVT. IVC filter placed. DVT resolved on US 2018.  The patient presented to the wound clinic initial consultation 1/3/2018 for a right medial ankle wound that has been present since aug 2018.  Evidence of venous insuffiency on duplex and was referred to IR but declined EVLA. She was treated with compression and topical collagen dressings and went on to heal in 2019.      Interval HPI 2020:   Present for follow up today. She was healed last visit but kept in her wrap to allow for further maturation of wound.   She brought in stockings today and is ready to transition.   Denies new pain, fever or recent illness.   She is also requesting release back to full duty.     Current Treatment Regimen:  Dressing:  Imelda    Frequency:  Twice a week   Changed by:  Wound care clinic nurse    Review of Systems:  Pertinent items are noted in HPI (history of present illness).    Past Medical History:   Diagnosis Date   • Blood clot  Problem: Pain  Goal: Verbalizes/displays adequate comfort level or baseline comfort level  Outcome: Progressing  Flowsheets  Taken 11/6/2022 0143 by Chris Durant RN  Verbalizes/displays adequate comfort level or baseline comfort level:   Encourage patient to monitor pain and request assistance   Assess pain using appropriate pain scale   Administer analgesics based on type and severity of pain and evaluate response   Implement non-pharmacological measures as appropriate and evaluate response   Consider cultural and social influences on pain and pain management   Notify Licensed Independent Practitioner if interventions unsuccessful or patient reports new pain    Problem: ABCDS Injury Assessment  Goal: Absence of physical injury  Outcome: Progressing  Flowsheets (Taken 11/6/2022 0143)  Absence of Physical Injury: Implement safety measures based on patient assessment     Problem: Safety - Adult  Goal: Free from fall injury  Outcome: Progressing  Note: Pt is a Fall Risk. See Melody Cortez Fall Risk Score. Pt bed in low position and side rails up. Call light and belongings in reach. Pt encouraged to call for assistance. Will continue with hourly rounds for PO intake, pain needs, toileting, and repositioning as needed. Bed alarm on while being monitored for hypotension. Will reevaluate if still needed.       Problem: Cardiovascular - Adult  Goal: Maintains optimal cardiac output and hemodynamic stability  Outcome: Progressing  Flowsheets (Taken 11/6/2022 0143)  Maintains optimal cardiac output and hemodynamic stability:   Monitor blood pressure and heart rate   Monitor urine output and notify Licensed Independent Practitioner for values outside of normal range   Assess for signs of decreased cardiac output   Administer fluid as ordered     Problem: Cardiovascular - Adult  Goal: Absence of cardiac dysrhythmias or at baseline  Outcome: Progressing     Problem: Hematologic - Adult  Goal: Maintains hematologic associated with vein wall inflammation 08/18/2017    DVT R common femoral vein with large PE, following R MARCELLO   • Chronic pain    • Hyperthyroidism    • Non-healing wound of lower extremity, initial encounter 01/03/2019    Right medial ankle present x4-5 months per pt report   • Osteoarthritis of right hip    • PE (pulmonary thromboembolism) (CMS/HCC) 08/2017    s/p MARCELLO, large saddle PE (pt was at home, had CP, became unresponsive)   • Thyroid disease     Graves disease= no medications   • Vitamin D deficiency      Past Surgical History:   Procedure Laterality Date   • Breast surgery Left 2009    Papilloma / Left breast   • Incision and drainage hip Right 08/18/2017    evacuation of hematoma post total hip replacement. Dr Abreu. [facility]   • Ivc filter placement  08/18/2017    deployed through Right IJ   • Ivc filter retrieval  09/20/2017   • Mammo stereotactic biopsy left Left 10/17/2012    Left breast   • Tonsillectomy and adenoidectomy  1970    as a child; < 14 y/o   • Total hip replacement Right 08/10/2017    Dr Abreu. [facility]   • Tubal ligation Bilateral 1983    for sterilization purposes approximately @ age 21 per pt report   • Vein ligation and stripping  1981   • [place] tooth extraction       Social History     Socioeconomic History   • Marital status: Single     Spouse name: Not on file   • Number of children: Not on file   • Years of education: Not on file   • Highest education level: Not on file   Occupational History   • Not on file   Social Needs   • Financial resource strain: Not on file   • Food insecurity:     Worry: Not on file     Inability: Not on file   • Transportation needs:     Medical: Not on file     Non-medical: Not on file   Tobacco Use   • Smoking status: Former Smoker     Packs/day: 0.25     Years: 35.00     Pack years: 8.75     Types: Cigarettes   • Smokeless tobacco: Never Used   Substance and Sexual Activity   • Alcohol use: No     Frequency: Monthly or less     Drinks per session: 1 or 2  stability  Outcome: Progressing  Flowsheets (Taken 11/6/2022 0143)  Maintains hematologic stability:   Assess for signs and symptoms of bleeding or hemorrhage   Monitor labs for bleeding or clotting disorders  Note: Still for monitoring hgb is trending down but will have blood draws for the rest of the morning. Residents kept updated. Possible blood transfusion according to on call resident if hypotension persists.     Binge frequency: Never     Comment: rare occasion   • Drug use: No   • Sexual activity: Not on file   Lifestyle   • Physical activity:     Days per week: Not on file     Minutes per session: Not on file   • Stress: Not on file   Relationships   • Social connections:     Talks on phone: Not on file     Gets together: Not on file     Attends [employer] service: Not on file     Active member of club or organization: Not on file     Attends meetings of clubs or organizations: Not on file     Relationship status: Not on file   • Intimate partner violence:     Fear of current or ex partner: Not on file     Emotionally abused: Not on file     Physically abused: Not on file     Forced sexual activity: Not on file   Other Topics Concern   • Not on file   Social History Narrative   • Not on file     Family History   Problem Relation Age of Onset   • Hypertension Mother    • Diabetes Mother    • Hypertension Father    • Diabetes Father    • Heart disease Father    • Hearing Loss Father        Current Outpatient Medications   Medication Sig   • phentermine 30 MG capsule Take 1 capsule by mouth every morning.     No current facility-administered medications for this encounter.         ALLERGIES: no known allergies.    OBJECTIVE:  Vital Signs:    Visit Vitals  /82 (BP Location: LUE - Left upper extremity, Patient Position: Sitting)   Pulse 88   Temp 97.6 °F (36.4 °C) (Temporal)   Resp 16         Physical Exam:  General appearance: Appears stated age, Alert, in no distress and cooperative  Pulmonary: normal respiratory effort  Cardiac: Pulses:  Dorsalis pedis  Right  Present 2+  Extremities: edema none appreciated.     Right medial ankle with localized area of hyperpigmentation. No open wound. Surrounding varicosities noted.           Wound Bed Quality:  as above      Hiral-wound Quality:    See above    Additional Descriptors:  as above    Wound Measurements Per Flowsheet:       Wound Ankle Right Medial Venous Ulcer  (Active)   Wound Length (cm) 0.1 cm 1/2/2020  2:00 PM   Wound Width (cm) 0.1 cm 1/2/2020  2:00 PM   Wound Depth (cm) 0 cm 1/2/2020  2:00 PM   Wound Surface Area (cm^2) 0.01 cm^2 1/2/2020  2:00 PM   Wound Volume (cm^3) 0 cm^3 1/2/2020  2:00 PM   Wound Volume Change (Initial) -0.22 cm3 1/2/2020  2:00 PM   Wound Volume % Change (Initial) -100 % 1/2/2020  2:00 PM   Wound Volume Change (30 days) -0.18 cm3 1/2/2020  2:00 PM   Wound Volume % Change (30 days) -100 % 1/2/2020  2:00 PM   Number of days: 371     PROCEDURE:  None performed  Not indicated    Procedure was Performed by:  Not applicable        Laboratory assessments reviewed:  No results found for: PAB   Albumin (g/dL)   Date Value   04/08/2019 4.1   05/10/2018 3.9   11/01/2017 3.6      No results available in last 24 hours    Lab Results   Component Value Date    WBC 5.0 04/08/2019    GLUCOSE 87 04/08/2019    HGBA1C 5.4 04/08/2019    CREATININE 0.70 04/08/2019    GFRA >90 04/08/2019    GFRNA >90 04/08/2019        Culture results:  Specimen Description (no units)   Date Value   01/03/2019 SWAB ANKLE RIGHT WOUND, POST DEBRIDEMENT    CULTURE (no units)   Date Value   01/03/2019 VERY RARE STAPHYLOCOCCUS, COAGULASE NEGATIVE (P)        Diagnostic Assessments Reviewed:  Vascular Studies:  Venous duplex study       Right Lower EXT Arterial Duplex 1/30/2019  IMPRESSION:   Patent right lower extremity arteries. No definite hemodynamically  significant stenosis.    US EXTREMITY VENOUS DUPLEX INSUFFICIENCY RIGHT  1/30/2019  IMPRESSION:    Right leg  1. Deep venous system- no evidence of deep venous insufficiency.  2. Great saphenous vein- evidence for extensive right great saphenous vein  superficial venous insufficiency as detailed above.  3. Small saphenous vein- no evidence of small saphenous vein superficial  venous insufficiency.  4. Pertinent accessory, , nontruncal veins- multiple subcutaneous  branch varicosities are noted below the level of the knee,  majority of  which appear to arise from the right great saphenous vein.  5. No evidence of DVT.       Venous duplex 3/2018  Interpretation Data  On the right, the common femoral, great saphenous, deep femoral, femoral,   popliteal, posterior tibial, peroneal, small saphenous veins were identified.   All areas responded normally to venous compression and augmentation.  There   was normal, spontaneous phasic flow.  There was no evidence of venous   thrombosis on the right.  On the left, the common femoral, deep femoral, femoral, popliteal, posterior   tibial, peroneal, small saphenous veins were identified. All areas responded   normally to venous compression and augmentation.  There was normal,   spontaneous phasic flow.  There was no evidence of venous thrombosis on the   left.  The left great saphenous vein is surgically absent.     Nutritional Assessment:  Prealbumin and/or Albumin reviewed    Wound treatment goals are palliative:  No    DIAGNOSES:  Venous insufficiency ulcer, chronic recurrent, right ankle- healed    Venous insufficiency, chronic right leg    Tobacco use       Medical Decision Making:     Wound remains healed. She has 20-30mmHg jobst compression stockings present on exam today.     Local wound care:  Continue foam dressing for padding of ulcer.  Religous daily use of stockings.     -Compression stockings 20-30 mmHg- she has thigh high for anticipated vascular intervention.     Vascular:Plans to schedule Varithena injection with Dr. Knowles. Encouraged her to follow through with this to prevent further wounds.     TOB use:Continue to cut back on Vap use.  She uses 6mg every 3 weeks.      Discussed with patient that wounds have epithelialized.  Reviewed recommended  care following epithelialization and healing.  Discussed the tensile strength of these wounds are only 80-85% of normal skin at 3 - 6 months.    Continue to offload areas of previous wounds to prevent breakdown of new fragile  epithelium.  Continue good glycemic control and increased protein intake to promote phase 4 of wound healing.  Reinforced need for continued local skin care (moisturize dry areas, wash gently...etc.)  Continue good protein intake to promote continued wound re-modeling (phase 4 of healing) process.   No need to follow up in clinic, but if wounds re-occur, please call to make follow up appointment.    Work release letter provided- ok to return to full duty.     Plan of Care:  Advanced Wound Care Recommendations:  See above  Percent Wound Closure from consult:  See above  Care plan to augment wound closure:  Not applicable.       All questions were answered.     AARON Gonzlaez  [facility]

## 2022-11-06 NOTE — DISCHARGE INSTRUCTIONS
-Please do not take any NSAIDs (Ibuprofen, Aleve, Motrin, Naproxen, Toradol etc)  -If there is any bleeding from your Rectal region or bleeding from any other site, also if you feel dizzy or feel like passing, shortness of breath or chest pain out please immediately go to an ED or call 911  -Please monitor your blood pressure 2 times daily, keep a record to show to your PCP, if your blood pressure goes the higher one goes below 100 call your PCP and if below 90 call 911  -Please follow up with your gastroenterologist at [facility] 9 your protonix twice daily

## 2022-11-06 NOTE — CARE COORDINATION
Case Management Assessment            Discharge Note                    Date / Time of Note: 11/6/2022 4:00 PM                  Discharge Note Completed by: Tisha Donohue RN    Patient Name: Sissy Tay   YOB: 1955  Diagnosis: Acute GI bleeding [K92.2]  Lower GI bleed [K92.2]   Date / Time: 11/3/2022 11:24 PM    Current PCP: Teressa Watters MD  Clinic patient: {Yes/No:19726}    Hospitalization in the last 30 days: {Yes/No:19726}    Advance Directives:  Code Status: Full Code  [place] DNR form completed and on chart: {RESPONSES; YES/NO/NOT DIANN:32537}    Financial:  Payor: Yakelin Pool / Plan: Osbaldo Davis / Product Type: *No Product type* /      Pharmacy:    [address] [facility] [address] [facility] [address] [facility] [phone]  [address]  Phone: [phone] Fax: [phone]      Assistance purchasing medications?:    Assistance provided by Case Management: { Assistance provided:64457}    Does patient want to participate in local refill/ meds to beds program?: Yes    Meds To Beds General Rules:  1. Can ONLY be done Monday- Friday between 8:30am-5pm  2. Prescription(s) must be in pharmacy by 3pm to be filled same day  3. Copy of patient's insurance/ prescription drug card and patient face sheet must be sent along with the prescription(s)  4. Cost of Rx cannot be added to hospital bill. If financial assistance is needed, please contact unit  or ;  or  CANNOT provide pharmacy voucher for patients co-pays  5.  Patients can then  the prescription on their way out of the hospital at discharge, or pharmacy can deliver to the bedside if staff is available. (payment due at time of pick-up or delivery - cash, check, or card accepted)     Able to afford home medications/ co-pay costs: {Yes/No:19726}    ADLS:  Current PT AM-PAC Score:   /24  Current OT AM-PAC Score: /24      DISCHARGE Disposition: { Discharge Disposition:85617}    LOC at discharge: { LOC at Discharge:65669}  ISAIAS Completed: {RESPONSES; Yes/No/Not Indicated:22892}    Notification completed in HENS/PAS?:  { HENS:11506}    IMM Completed:   { AMIRAH:73147}    Transportation:  Transportation PLAN for discharge: {FRZ:47376}   Mode of Transport: { Mode of Transport:68474}  Reason for medical transport: { Reason for Medical Transport:75186}  Name of Transport Company: { Transport Companies:89484}  Time of Transport: ***    Transport form completed: {RESPONSES; Yes/No/Not Indicated:77719}    Home Care:  [address] Paredes ordered at discharge: {RESPONSES; Yes/No/Not Indicated:28410}  Home Care Agency: { Home Care Providers:22237}  Orders faxed: {Yes/No:19726}    Durable Medical Equipment:  DME Provider: ***  Equipment obtained during hospitalization: {EQUIPMENT:979152255}    Home Oxygen and Respiratory Equipment:  Oxygen needed at discharge?: {RESPONSES; YES/NO/NOT YBBNUHWYX:80979}  [address]: { OXYGEN Providers:39947}  Portable tank available for discharge?: {RESPONSES; Yes/No/Not Indicated:98800}    Dialysis:  Dialysis patient: {Yes/No:19726}    Dialysis Center:  { HD Facilities:10181}    Hospice Services:  Location: { Hospice Service Location:83938}  Agency: { Hospice Agencies:66789}    Consents signed: {RESPONSES; Yes/No/Not Indicated:29249}    Referrals made at [facility] for outpatient continued care:  { Discharge OP Referrals:22747}    Additional  Notes: ***        New RX:  E scribed to her own pharmacy         these medications from [address], [place] - [address] [facility] - [address] [phone]  pantoprazole     Schedule an appointment with Poly Garcia as soon as possible for a visit in 1 week(s)  Henry     Call Dr. Wesly Menendez MD in 1 week(s)  [address]   [address]  [facility] [address]   [phone]     Call Rolando Chou MD in 1 week(s)  [address] [facility]   [address]   [phone]    The Plan for Transition of Care is related to the following treatment goals of Acute GI bleeding [K92.2]  Lower GI bleed [K92.2]    The Patient and/or patient representative Ivan Guerrero and her family were provided with a choice of provider and agrees with the discharge plan Yes    Freedom of choice list was provided with basic dialogue that supports the patient's individualized plan of care/goals and shares the quality data associated with the providers.  Yes    Care Transitions patient: No    Melanie Parmar RN  [facility] [facility]  Case Management Department  Ph: [phone]

## 2022-11-06 NOTE — PROGRESS NOTES
Pt's daughter called for updates. All questions answered. She want to be contacted by attending and GI doctor on phone for the plan f care for her mother. Will let day shift RN know.

## 2022-11-06 NOTE — PROGRESS NOTES
Pt was very agitated this morning, refusing care. Pt refused VS, Labs and BS to be performed early this morning. MD were notified by perfect serve and verbally. Two MD came at bedside, and talked with the pt and pt families at different occasion, then later, pt was okay for Iron infusion, lab draw and VS. Pt is currently calm with family member at bedside. Will continue to monitor.

## 2022-11-06 NOTE — PROGRESS NOTES
9576 Notified on call resident. Dr. Dietrich Buerger regarding decline of Hgb 7.1, Hct 20.7, /66. Ordered for transfusion. 56 Checked for consent, in chart and appropriate labs ( type and screen still valid until 11/06 2346)     0349 Pre-BT Vital signs stable. No pre-BT meds. Checked blood component with EMELINA Sidhu    8712 BT started. 0407 V/S stable first 15 mins. Increase rate to 150ml/hr. Ongoing BT. Will continue to monitor.  Next H&H is 1 hr post BT.

## 2022-11-06 NOTE — PLAN OF CARE
Problem: Discharge Planning  Goal: Discharge to home or other facility with appropriate resources  Outcome: Completed  Flowsheets (Taken 11/6/2022 1022)  Discharge to home or other facility with appropriate resources:   Identify barriers to discharge with patient and caregiver   Arrange for needed discharge resources and transportation as appropriate   Identify discharge learning needs (meds, wound care, etc)   Arrange for interpreters to assist at discharge as needed   Refer to discharge planning if patient needs post-hospital services based on physician order or complex needs related to functional status, cognitive ability or social support system     Problem: Pain  Goal: Verbalizes/displays adequate comfort level or baseline comfort level  Outcome: Completed     Problem: ABCDS Injury Assessment  Goal: Absence of physical injury  Outcome: Completed     Problem: Safety - Adult  Goal: Free from fall injury  Outcome: Completed     Problem: Chronic Conditions and Co-morbidities  Goal: Patient's chronic conditions and co-morbidity symptoms are monitored and maintained or improved  Outcome: Completed  Flowsheets (Taken 11/6/2022 1022)  Care Plan - Patient's Chronic Conditions and Co-Morbidity Symptoms are Monitored and Maintained or Improved:   Monitor and assess patient's chronic conditions and comorbid symptoms for stability, deterioration, or improvement   Collaborate with multidisciplinary team to address chronic and comorbid conditions and prevent exacerbation or deterioration   Update acute care plan with appropriate goals if chronic or comorbid symptoms are exacerbated and prevent overall improvement and discharge     Problem: Nutrition Deficit:  Goal: Optimize nutritional status  Outcome: Completed     Problem: Cardiovascular - Adult  Goal: Maintains optimal cardiac output and hemodynamic stability  Outcome: Completed  Goal: Absence of cardiac dysrhythmias or at baseline  Outcome: Completed     Problem: Metabolic/Fluid and Electrolytes - Adult  Goal: Electrolytes maintained within normal limits  Outcome: Completed  Flowsheets (Taken 11/6/2022 1022)  Electrolytes maintained within normal limits:   Monitor labs and assess patient for signs and symptoms of electrolyte imbalances   Administer electrolyte replacement as ordered   Monitor response to electrolyte replacements, including repeat lab results as appropriate   Fluid restriction as ordered   Instruct patient on fluid and nutrition restrictions as appropriate  Goal: Hemodynamic stability and optimal renal function maintained  Outcome: Completed  Flowsheets (Taken 11/6/2022 1022)  Hemodynamic stability and optimal renal function maintained:   Monitor labs and assess for signs and symptoms of volume excess or deficit   Monitor intake, output and patient weight   Monitor response to interventions for patient's volume status, including labs, urine output, blood pressure (other measures as available)   Monitor urine specific gravity, serum osmolarity and serum sodium as indicated or ordered   Encourage oral intake as appropriate   Instruct patient on fluid and nutrition restrictions as appropriate  Goal: Glucose maintained within prescribed range  Outcome: Completed  Flowsheets (Taken 11/6/2022 1022)  Glucose maintained within prescribed range:   Monitor blood glucose as ordered   Assess for signs and symptoms of hyperglycemia and hypoglycemia   Administer ordered medications to maintain glucose within target range   Assess barriers to adequate nutritional intake and initiate nutrition consult as needed   Instruct patient on self management of diabetes and initiate consult as needed     Problem: Hematologic - Adult  Goal: Maintains hematologic stability  Outcome: Completed  Flowsheets (Taken 11/6/2022 1022)  Maintains hematologic stability:   Assess for signs and symptoms of bleeding or hemorrhage   Monitor labs for bleeding or clotting disorders   Administer blood products/factors as ordered     Problem: Anxiety  Goal: Will report anxiety at manageable levels  Description: INTERVENTIONS:  1. Administer medication as ordered  2. Teach and rehearse alternative coping skills  3. Provide emotional support with 1:1 interaction with staff  Outcome: Completed  Flowsheets (Taken 11/6/2022 1022)  Will report anxiety at manageable levels:   Administer medication as ordered   Teach and rehearse alternative coping skills   Provide emotional support with 1:1 interaction with staff     Problem: Death & Dying  Goal: Pt/Family communicate acceptance of impending death and feel psychological comfort and peace  Description: INTERVENTIONS:  1. Assess patient/family anxiety and grief process related to end of life issues  2. Provide emotional and spiritual support  3. Provide information about the patient's health status with consideration of family and cultural values  4. Communicate willingness to discuss death and facilitate grief process  with patient/family as appropriate  5. Emphasize sustaining relationships within family system and community, or valerio/spiritual traditions  6.  Initiate Spiritual Care, Psychosocial Clinical Specialist, consult as needed  Outcome: Completed  Flowsheets (Taken 11/6/2022 1022)  Patient/family communicates acceptance of loss or impending death and feels physical/psychological comfort and peace:   Assess patient/family anxiety and grief process related to end of life issues   Provide information about the patients health status with consideration of family and cultural values   Provide emotional and spiritual support   Communicate willingness to discuss death and facilitate grief process  with patient/family as appropriate   Emphasize sustaining relationships within family system and community, or valerio/spiritual traditions   Initiate Spiritual Care, Psychosocial Clinical Specialist, consult as needed     Problem: Change in Body Image  Goal: Pt/Family communicate acceptance of loss or change in body image and feel psychological comfort and peace  Description: INTERVENTIONS:  1. Assess patient/family anxiety and grief process related to change in body image, loss of functional status, loss of sense of self, and forgiveness  2. Provide emotional and spiritual support  3. Provide information about the patient's health status with consideration of family and cultural values  4. Communicate willingness to discuss loss and facilitate grief process with patient/family as appropriate  5. Emphasize sustaining relationships within family system and community, or valerio/spiritual traditions  6. Initiate Spiritual Care, Psychosocial Clinical Specialist consult as needed  Outcome: Completed  Flowsheets (Taken 11/6/2022 1022)  Patient/family communicate acceptance of loss or change in body image and feel psychological comfort and peace:   Provide emotional and spiritual support   Assess patient/family anxiety and grief process related to change in body image, loss of functional status, loss of sense of self, and forgiveness   Provide information about the patients health status with consideration of family and cultural values   Communicate willingness to discuss loss and facilitate grief process with patient/family as appropriate   Emphasize sustaining relationships within family system and community, or valerio/spiritual traditions   Initiate Spiritual Care, Psychosocial Clinical Specialist consult as needed     Problem: Behavior  Goal: Pt/Family maintain appropriate behavior and adhere to behavioral management agreement, if implemented  Description: INTERVENTIONS:  1. Assess patient/family's coping skills and  non-compliant behavior (including use of illegal substances)  2. Notify security of behavior or suspected illegal substances which indicate the need for search of the family and/or belongings  3.  Encourage verbalization of thoughts and concerns in a socially appropriate manner  4. Utilize positive, consistent limit setting strategies supporting safety of patient, staff and others  5. Encourage participation in the decision making process about the behavioral management agreement  6. If a visitor's behavior poses a threat to safety call refer to organization policy. 7. Initiate consult with , Psychosocial CNS, Spiritual Care as appropriate  Outcome: Completed  Flowsheets (Taken 11/6/2022 1022)  Patient/family maintains appropriate behavior and adheres to behavioral management agreement, if implemented:   Assess patient/familys coping skills and  non-compliant behavior (including use of illegal substances)   Notify security of behavior or suspected illegal substances which indicate the need for search of the patient and/or belongings   Encourage verbalization of thoughts and concerns in a socially appropriate manner   Utilize positive, consistent limit setting strategies supporting safety of patient, staff and others   Encourage participation in the decision making process about the behavioral management agreement   Implement a Health Care Agreement if patient meets criteria   If a patients behavior jeopardizes the safety of the patient, staff, or others refer to organization policy. If a visitors behavior poses a threat to safety refer to organization policy   Initiate consult with , Psychosocial Clinical Nurse Specialist, Spiritual Care as appropriate     Problem: Involuntary Admit  Goal: Will cooperate with staff recommendations and doctor's orders and will demonstrate appropriate behavior  Description: INTERVENTIONS:  1. Treat underlying conditions and offer medication as ordered  2. Educate regarding involuntary admission procedures and rules  3.  Contain excessive/inappropriate behavior per unit and hospital policies  Outcome: Completed  Flowsheets (Taken 11/6/2022 1022)  Will cooperate with staff recommendations and doctor's orders and will demonstrate appropriate behavior:   Treat underlying conditions and offer medication as ordered   Educate regarding involuntary admission procedures and rules   Contain excessive/inappropriate behavior per unit and hospital policies

## 2022-11-06 NOTE — PROGRESS NOTES
Pt refused to take 2nd tab of 5mg zyprexa for a total of 10mg. RN asked if she can recall her dose as her home meds in file says it's 10mg. States she only takes 1 tab but can't remember the dose. Firm on not taking the 2nd tablet. Talked to daughter, April and said what's on her file is her dose but she can't double check right now because pt's home meds are at home which she is not currently at. Informed on call resident, Dr. Kemper Hatchet to make aware.

## 2022-11-06 NOTE — PROGRESS NOTES
Patient left at [facility] [[email or web address]] on a wheelchair with daughter. AVS education was given, opportunity for asking questions was provided. Questions were addressed by both the MD and the RN. All patients belongings went home with pt.

## 2022-11-07 LAB
BLOOD BANK DISPENSE STATUS: NORMAL
BLOOD BANK DISPENSE STATUS: NORMAL
BLOOD BANK PRODUCT CODE: NORMAL
BLOOD BANK PRODUCT CODE: NORMAL
BPU ID: NORMAL
BPU ID: NORMAL
DESCRIPTION BLOOD BANK: NORMAL
DESCRIPTION BLOOD BANK: NORMAL

## 2022-11-08 LAB
HCV QNT BY NAAT IU/ML: ABNORMAL IU/ML
HCV QNT BY NAAT LOG IU/ML: 6.21 LOG IU/ML
INTERPRETATION: DETECTED

## 2022-11-13 LAB — EMERGENCY ISSUE BLOOD PRODUCTS SIGNED FORM: NORMAL

## 2022-12-01 NOTE — CONSENT
Informed Consent for Blood Component Transfusion Note    I have discussed with the patient the rationale for blood component transfusion; its benefits in treating or preventing fatigue, organ damage, or death; and its risk which includes mild transfusion reactions, rare risk of blood borne infection, or more serious but rare reactions. I have discussed the alternatives to transfusion, including the risk and consequences of not receiving transfusion. The patient had an opportunity to ask questions and had agreed to proceed with transfusion of blood components.     Electronically signed by Jordyn Frank MD on 12/1/22 at 12:39 AM EST

## 2022-12-14 ENCOUNTER — HOSPITAL ENCOUNTER (OUTPATIENT)
Dept: MAMMOGRAPHY | Age: 67
Discharge: HOME | End: 2022-12-19

## 2022-12-14 DIAGNOSIS — Z12.31 VISIT FOR SCREENING MAMMOGRAM: ICD-10-CM

## 2023-06-18 NOTE — PROGRESS NOTES
Internal Medicine Progress Note    Patient Name: Stephanie Barrow   Patient : 1955   Date: 2022   Admit Date: 11/3/2022     CC: Rectal Bleeding (Pt brought in by EMS for sudden onset of dark rectal bleeding at dinner time today. Pt is lethargic, alert and oriented x 4. Per EMS blood pressure was 70/40. Pt c/o left sided abdominal pain that started a few minutes ago. Pt states she is SOB and requesting oxygen therapy )       Subjective     Interval History: Overnight: No acute events overnight   On today's encounter Ms. Vasyl Felix says she was feeling better and not in any visible distress. Ms. Vasyl Felix says she is not experiencing the same left-sided abdominal pain she felt yesterday in the ED. Patient has passed stool since being admitted and states she has not noticed passage of blood since admission. Pt states she occasionally feels short of breath and is a current 1 PPD smoker. Pt acknowledges non-compliance with medications prescribed for chronic conditions. She denies any significant past or present alcohol consumption. She denies CP, f/c, n/v, abdominal pain, abdominal swelling/distention, lethargy, and lightheadedness    ROS:  As per interval history above. Objective     Vital Signs:  Patient Vitals for the past 8 hrs:   BP Temp Temp src Pulse Resp SpO2 Height   22 1514 -- -- -- -- -- -- 5' 3\" (1.6 m)   22 1220 133/89 98.3 °F (36.8 °C) Oral 79 18 -- --   22 1030 -- 98.3 °F (36.8 °C) Oral 86 19 98 % --   22 0848 135/84 98.2 °F (36.8 °C) Oral 82 19 99 % --       Physical Exam:  Physical Exam  Constitutional:       General: She is not in acute distress. Appearance: She is obese. Comments: Alert & oriented   Cardiovascular:      Rate and Rhythm: Normal rate and regular rhythm. Pulses: Normal pulses. Heart sounds: No murmur heard. Pulmonary:      Effort: No respiratory distress. Breath sounds: No wheezing.    Chest:      Chest wall: No tenderness. Abdominal:      General: Bowel sounds are normal. There is no distension. Palpations: Abdomen is soft. Tenderness: There is no abdominal tenderness. There is no guarding or rebound. Negative fluid thrill. MANDY: bright red blood seen, no mass palpated, chronic fissures  Musculoskeletal:      Right lower leg: No edema. Left lower leg: No edema. Neurological:      General: No focal deficit present. Mental Status: She is alert. Intake/Output Summary (Last 24 hours) at 11/4/2022 1618  Last data filed at 11/4/2022 0030  Gross per 24 hour   Intake 300 ml   Output --   Net 300 ml        Medications:   sodium chloride flush  5-40 mL IntraVENous 2 times per day    OLANZapine  10 mg Oral Nightly    pantoprazole  40 mg IntraVENous BID    insulin lispro  0-4 Units SubCUTAneous TID WC    insulin lispro  0-4 Units SubCUTAneous Nightly       sodium chloride      dextrose      sodium chloride 100 mL/hr at 11/04/22 0800      sodium chloride flush, sodium chloride, ondansetron **OR** ondansetron, polyethylene glycol, acetaminophen **OR** acetaminophen, glucose, dextrose bolus **OR** dextrose bolus, glucagon (rDNA), dextrose     Labs:  CBC:   Recent Labs     11/03/22  2346 11/04/22  0441 11/04/22  1158   WBC 10.5 12.3*  --    HGB 11.5* 12.0 12.0   HCT 33.9* 35.4* 35.2*    185  --    MCV 93.0 90.8  --        Renal:    Recent Labs     11/03/22  2346 11/04/22  0442    139   K 4.0 4.3    105   CO2 26 27   BUN 25* 23*   CREATININE 0.8 0.7   GLUCOSE 208* 136*   CALCIUM 8.8 9.1   ANIONGAP 8 7       Hepatic:   Recent Labs     11/04/22  1158   AST 43*   ALT 27   BILITOT 0.5   BILIDIR <0.2   PROT 6.2*   LABALBU 3.6   ALKPHOS 65       Troponin: Invalid input(s): TROPONIN    Lactic acid: Invalid input(s): LACTICACID    BNP: No results for input(s): BNP in the last 72 hours. Pro-BNP: No results for input(s): PROBNP in the last 72 hours.     Lipids: No results for input(s): CHOL, TRIG, HDL, LDLCALC, VLDL in the last 72 hours. ABGs:  No results for input(s): PHART, CPQ7WJN, PO2ART, OIW8SHX, BEART, THGBART, Q8KJSPOP, CNF7ERP in the last 72 hours. VBGs: No results for input(s): PHVEN, WHU4GCI, PO2VEN in the last 72 hours. INR:   Recent Labs     11/03/22  2346   INR 1.07     aPTT:   Recent Labs     11/03/22  2346   APTT 26.6       Procalcitonin: No results for input(s): PROCAL in the last 72 hours. CRP: No results for input(s): CRP in the last 72 hours. ESR: No results for input(s): ESR in the last 72 hours. Radiology:  CTA ABDOMEN PELVIS W WO CONTRAST   Final Result      This exam is severely degraded by motion making it impossible to determine if there is contrast extravasation into the bowel lumen. Evaluation with nuclear medicine tagged red blood cell imaging may be helpful. US LIVER    (Results Pending)       Assessment & Plan   Lower GI bleed: There is high clinical suspicion for lower GI bleed due to varices with lower suspicion of upper bleed. Patient presented with episodic bright red kj blood in stools multiple times (3x) accompanied by diffuse abdominal pain with lingering sharp tenderness in the LLQ. Patient has hx of chronic hepatitis C, cirrhosis, has never had a colonoscopy, and was taking Ibuprofen 600 mg BID for ~3 weeks just recently. Pt had a previous EGD done which showed presence of upper esophageal varices.    Hgb: 11.5 -> 12 (11/4/22)  Hct: 35.4 -> 35.2 (11/4/22)   LFTs: AST: 43 (11/4/22) ALT: 27 (11/4/22)  -Consulted GI  -Colonoscopy and EGD planned for tomorrow  -Bowel regimen for prep: Bisacodyl & Polyethylene Glycol  -Continue IVF  -Continue IV Pantoprazole 40 mg BID  -Continue monitoring H&H:       Chronic Medical Conditions  Chronic Hepatitis C/Cirrhosis  Last viral load 012520 on 09/13/21   On no current medication regimen  -Ordered viral panel      Hypertension  -On no current medications     Type 2 Diabetes Mellitus  -Continue lispro sc 0.4-u  -Continue lispro sc 0.4-u TID w/ meals     Schizophrenia    -Continue home Olanzapine    Dementia  -On memantine    DVT PPx:None  Diet:  ADULT DIET;  Clear Liquid  ADULT ORAL NUTRITION SUPPLEMENT; Breakfast, Lunch, Dinner; Clear Liquid Oral Supplement  Diet NPO   Code status:  Full Code     ELOS:3 days  Barriers to discharge:Lower GI bleed source  Disposition  - Preadmission:home (skilled nursing homes)  - Current:IP  - Upon discharge:home (skilled nursing homes)    Will discuss with attending physician Dr. Yogesh Huston MD.     Catrina Lozano MD  Internal Medicine, PGY-1 No

## 2023-10-31 ENCOUNTER — HOSPITAL ENCOUNTER (EMERGENCY)
Age: 68
Discharge: HOME | End: 2023-10-31
Attending: STUDENT IN AN ORGANIZED HEALTH CARE EDUCATION/TRAINING PROGRAM
Payer: MEDICARE

## 2023-10-31 VITALS
BODY MASS INDEX: 31.54 KG/M2 | HEART RATE: 71 BPM | DIASTOLIC BLOOD PRESSURE: 77 MMHG | RESPIRATION RATE: 18 BRPM | WEIGHT: 178 LBS | OXYGEN SATURATION: 99 % | HEIGHT: 63 IN | SYSTOLIC BLOOD PRESSURE: 133 MMHG | TEMPERATURE: 97.8 F

## 2023-10-31 DIAGNOSIS — E87.6 HYPOKALEMIA: ICD-10-CM

## 2023-10-31 DIAGNOSIS — R10.84 GENERALIZED ABDOMINAL PAIN: Primary | ICD-10-CM

## 2023-10-31 LAB
ALBUMIN SERPL-MCNC: 4 G/DL (ref 3.4–5)
ALP SERPL-CCNC: 88 U/L (ref 40–129)
ALT SERPL-CCNC: <5 U/L (ref 10–40)
ANION GAP SERPL CALCULATED.3IONS-SCNC: 10 MMOL/L (ref 3–16)
AST SERPL-CCNC: 12 U/L (ref 15–37)
BASOPHILS # BLD: 0 K/UL (ref 0–0.2)
BASOPHILS NFR BLD: 0.3 %
BILIRUB DIRECT SERPL-MCNC: <0.2 MG/DL (ref 0–0.3)
BILIRUB INDIRECT SERPL-MCNC: ABNORMAL MG/DL (ref 0–1)
BILIRUB SERPL-MCNC: 0.4 MG/DL (ref 0–1)
BUN SERPL-MCNC: 14 MG/DL (ref 7–20)
CALCIUM SERPL-MCNC: 9.4 MG/DL (ref 8.3–10.6)
CHLORIDE SERPL-SCNC: 100 MMOL/L (ref 99–110)
CO2 SERPL-SCNC: 28 MMOL/L (ref 21–32)
CREAT SERPL-MCNC: 1.1 MG/DL (ref 0.6–1.2)
DEPRECATED RDW RBC AUTO: 13.8 % (ref 12.4–15.4)
EOSINOPHIL # BLD: 0.2 K/UL (ref 0–0.6)
EOSINOPHIL NFR BLD: 1.9 %
GFR SERPLBLD CREATININE-BSD FMLA CKD-EPI: 55 ML/MIN/{1.73_M2}
GLUCOSE SERPL-MCNC: 166 MG/DL (ref 70–99)
HCT VFR BLD AUTO: 38.3 % (ref 36–48)
HGB BLD-MCNC: 13.3 G/DL (ref 12–16)
LIPASE SERPL-CCNC: 20 U/L (ref 13–60)
LYMPHOCYTES # BLD: 3.8 K/UL (ref 1–5.1)
LYMPHOCYTES NFR BLD: 43.1 %
MAGNESIUM SERPL-MCNC: 2 MG/DL (ref 1.8–2.4)
MCH RBC QN AUTO: 31.2 PG (ref 26–34)
MCHC RBC AUTO-ENTMCNC: 34.7 G/DL (ref 31–36)
MCV RBC AUTO: 90.1 FL (ref 80–100)
MONOCYTES # BLD: 0.5 K/UL (ref 0–1.3)
MONOCYTES NFR BLD: 5.8 %
NEUTROPHILS # BLD: 4.3 K/UL (ref 1.7–7.7)
NEUTROPHILS NFR BLD: 48.9 %
PLATELET # BLD AUTO: 172 K/UL (ref 135–450)
PMV BLD AUTO: 8.6 FL (ref 5–10.5)
POTASSIUM SERPL-SCNC: 3.3 MMOL/L (ref 3.5–5.1)
PROT SERPL-MCNC: 7.5 G/DL (ref 6.4–8.2)
RBC # BLD AUTO: 4.25 M/UL (ref 4–5.2)
SODIUM SERPL-SCNC: 138 MMOL/L (ref 136–145)
WBC # BLD AUTO: 8.7 K/UL (ref 4–11)

## 2023-10-31 PROCEDURE — 85025 COMPLETE CBC W/AUTO DIFF WBC: CPT

## 2023-10-31 PROCEDURE — 83690 ASSAY OF LIPASE: CPT

## 2023-10-31 PROCEDURE — 99283 EMERGENCY DEPT VISIT LOW MDM: CPT

## 2023-10-31 PROCEDURE — 80048 BASIC METABOLIC PNL TOTAL CA: CPT

## 2023-10-31 PROCEDURE — 83735 ASSAY OF MAGNESIUM: CPT

## 2023-10-31 PROCEDURE — 6370000000 HC RX 637 (ALT 250 FOR IP): Performed by: STUDENT IN AN ORGANIZED HEALTH CARE EDUCATION/TRAINING PROGRAM

## 2023-10-31 PROCEDURE — 80076 HEPATIC FUNCTION PANEL: CPT

## 2023-10-31 RX ORDER — ACETAMINOPHEN 500 MG
1000 TABLET ORAL
Status: COMPLETED | OUTPATIENT
Start: 2023-10-31 | End: 2023-10-31

## 2023-10-31 RX ORDER — POTASSIUM CHLORIDE 20 MEQ/1
40 TABLET, EXTENDED RELEASE ORAL ONCE
Status: COMPLETED | OUTPATIENT
Start: 2023-10-31 | End: 2023-10-31

## 2023-10-31 RX ADMIN — ACETAMINOPHEN 1000 MG: 500 TABLET ORAL at 06:01

## 2023-10-31 RX ADMIN — POTASSIUM CHLORIDE 40 MEQ: 1500 TABLET, EXTENDED RELEASE ORAL at 07:26

## 2023-10-31 ASSESSMENT — ENCOUNTER SYMPTOMS
SHORTNESS OF BREATH: 0
ABDOMINAL PAIN: 1
COUGH: 0
NAUSEA: 0
CONSTIPATION: 0
BLOOD IN STOOL: 0
VOMITING: 0

## 2023-10-31 ASSESSMENT — PAIN SCALES - GENERAL: PAINLEVEL_OUTOF10: 0

## 2023-10-31 ASSESSMENT — PAIN - FUNCTIONAL ASSESSMENT: PAIN_FUNCTIONAL_ASSESSMENT: 0-10

## 2024-06-10 ENCOUNTER — APPOINTMENT (OUTPATIENT)
Dept: CT IMAGING | Age: 69
End: 2024-06-10
Payer: MEDICARE

## 2024-06-10 ENCOUNTER — APPOINTMENT (OUTPATIENT)
Dept: GENERAL RADIOLOGY | Age: 69
End: 2024-06-10
Payer: MEDICARE

## 2024-06-10 ENCOUNTER — HOSPITAL ENCOUNTER (EMERGENCY)
Age: 69
Discharge: HOME | End: 2024-06-10
Attending: EMERGENCY MEDICINE
Payer: MEDICARE

## 2024-06-10 VITALS
SYSTOLIC BLOOD PRESSURE: 171 MMHG | RESPIRATION RATE: 18 BRPM | HEART RATE: 77 BPM | TEMPERATURE: 97.8 F | OXYGEN SATURATION: 98 % | DIASTOLIC BLOOD PRESSURE: 94 MMHG

## 2024-06-10 DIAGNOSIS — R91.1 PULMONARY NODULE: Primary | ICD-10-CM

## 2024-06-10 LAB
ANION GAP SERPL CALCULATED.3IONS-SCNC: 10 MMOL/L (ref 3–16)
BASOPHILS # BLD: 0 K/UL (ref 0–0.2)
BASOPHILS NFR BLD: 0.5 %
BUN SERPL-MCNC: 17 MG/DL (ref 7–20)
CALCIUM SERPL-MCNC: 9.2 MG/DL (ref 8.3–10.6)
CHLORIDE SERPL-SCNC: 103 MMOL/L (ref 99–110)
CO2 SERPL-SCNC: 26 MMOL/L (ref 21–32)
CREAT SERPL-MCNC: 1 MG/DL (ref 0.6–1.2)
D-DIMER QUANTITATIVE: 0.47 UG/ML FEU (ref 0–0.6)
DEPRECATED RDW RBC AUTO: 13.4 % (ref 12.4–15.4)
EKG ATRIAL RATE: 74 BPM
EKG DIAGNOSIS: NORMAL
EKG P AXIS: 60 DEGREES
EKG P-R INTERVAL: 170 MS
EKG Q-T INTERVAL: 378 MS
EKG QRS DURATION: 78 MS
EKG QTC CALCULATION (BAZETT): 419 MS
EKG R AXIS: 17 DEGREES
EKG T AXIS: 35 DEGREES
EKG VENTRICULAR RATE: 74 BPM
EOSINOPHIL # BLD: 0.2 K/UL (ref 0–0.6)
EOSINOPHIL NFR BLD: 2.2 %
GFR SERPLBLD CREATININE-BSD FMLA CKD-EPI: 61 ML/MIN/{1.73_M2}
GLUCOSE SERPL-MCNC: 132 MG/DL (ref 70–99)
HCT VFR BLD AUTO: 38.5 % (ref 36–48)
HGB BLD-MCNC: 13.2 G/DL (ref 12–16)
LYMPHOCYTES # BLD: 2.4 K/UL (ref 1–5.1)
LYMPHOCYTES NFR BLD: 28.8 %
MCH RBC QN AUTO: 30.8 PG (ref 26–34)
MCHC RBC AUTO-ENTMCNC: 34.3 G/DL (ref 31–36)
MCV RBC AUTO: 89.7 FL (ref 80–100)
MONOCYTES # BLD: 0.5 K/UL (ref 0–1.3)
MONOCYTES NFR BLD: 6.2 %
NEUTROPHILS # BLD: 5.2 K/UL (ref 1.7–7.7)
NEUTROPHILS NFR BLD: 62.3 %
PLATELET # BLD AUTO: 150 K/UL (ref 135–450)
PMV BLD AUTO: 8.3 FL (ref 5–10.5)
RBC # BLD AUTO: 4.29 M/UL (ref 4–5.2)
SODIUM SERPL-SCNC: 139 MMOL/L (ref 136–145)
TROPONIN, HIGH SENSITIVITY: 8 NG/L (ref 0–14)
TROPONIN, HIGH SENSITIVITY: 8 NG/L (ref 0–14)
WBC # BLD AUTO: 8.4 K/UL (ref 4–11)

## 2024-06-10 PROCEDURE — 93005 ELECTROCARDIOGRAM TRACING: CPT | Performed by: STUDENT IN AN ORGANIZED HEALTH CARE EDUCATION/TRAINING PROGRAM

## 2024-06-10 PROCEDURE — 80048 BASIC METABOLIC PNL TOTAL CA: CPT

## 2024-06-10 PROCEDURE — 85025 COMPLETE CBC W/AUTO DIFF WBC: CPT

## 2024-06-10 PROCEDURE — 6370000000 HC RX 637 (ALT 250 FOR IP): Performed by: STUDENT IN AN ORGANIZED HEALTH CARE EDUCATION/TRAINING PROGRAM

## 2024-06-10 PROCEDURE — 71260 CT THORAX DX C+: CPT

## 2024-06-10 PROCEDURE — 99285 EMERGENCY DEPT VISIT HI MDM: CPT

## 2024-06-10 PROCEDURE — 6360000004 HC RX CONTRAST MEDICATION: Performed by: STUDENT IN AN ORGANIZED HEALTH CARE EDUCATION/TRAINING PROGRAM

## 2024-06-10 PROCEDURE — 84484 ASSAY OF TROPONIN QUANT: CPT

## 2024-06-10 PROCEDURE — 85379 FIBRIN DEGRADATION QUANT: CPT

## 2024-06-10 PROCEDURE — 73130 X-RAY EXAM OF HAND: CPT

## 2024-06-10 PROCEDURE — 71046 X-RAY EXAM CHEST 2 VIEWS: CPT

## 2024-06-10 RX ORDER — LIDOCAINE 4 G/G
1 PATCH TOPICAL DAILY
Status: DISCONTINUED | OUTPATIENT
Start: 2024-06-10 | End: 2024-06-10 | Stop reason: HOSPADM

## 2024-06-10 RX ADMIN — IOPAMIDOL 75 ML: 755 INJECTION, SOLUTION INTRAVENOUS at 11:41

## 2024-06-10 ASSESSMENT — LIFESTYLE VARIABLES
HOW OFTEN DO YOU HAVE A DRINK CONTAINING ALCOHOL: MONTHLY OR LESS
HOW MANY STANDARD DRINKS CONTAINING ALCOHOL DO YOU HAVE ON A TYPICAL DAY: 1 OR 2

## 2024-06-10 ASSESSMENT — PAIN - FUNCTIONAL ASSESSMENT: PAIN_FUNCTIONAL_ASSESSMENT: NONE - DENIES PAIN

## 2024-06-10 NOTE — ED PROVIDER NOTES
ED Attending Attestation Note     Date of evaluation: 6/10/2024    This patient was seen by the resident.  I have seen and examined the patient, agree with the workup, evaluation, management and diagnosis. The care plan has been discussed.      I have reviewed the ECG and concur with the resident's interpretation.      My assessment reveals a 68-year-old female presenting to the emergency department with multiple complaints.  On examination lungs clear to auscultation without significant areas of focal decreased breath sounds.     Francisco Perez MD  06/10/24 1103

## 2024-06-10 NOTE — ED PROVIDER NOTES
THE [facility]  EMERGENCY DEPARTMENT ENCOUNTER          EM RESIDENT NOTE       Date of evaluation: 6/10/2024    Chief Complaint     Hemoptysis (2 months ago took  ibuprofen (15 years old), since then states she has been spitting blood./She did not take this ibuprofen since then. /Complain about pain in her right hand middle finger for 4 months.) and Hand Pain      History of Present Illness     Marla Cortes is a 68 y.o. female who presents for evaluation of hemoptysis and hand pain.    Patient presents for concern after several episodes of spitting up blood.  States she took a 14 year old ibuprofen that she found in her daughter's house at the beginning of May.  States that approximately 1 week later, she felt a tickle at the back of her throat and she began intermittently spitting up blood.  She denies any emesis and does deny coughing up the blood.  States that she just felt a discomfort in the back of her throat that preceded the bloody sputum.  States that this is continued until approximately 3 days ago when it spontaneously resolved.      She denies chest pain but does endorse a sensation of chest tightness that stops her from taking a deep breath in.  She does smoke cigarettes and is supposed to take an inhaler but does not.  She also is on amlodipine for high blood pressure but states that she only takes this intermittently.  She otherwise denies any recent travel or prolonged periods of immobilization including recent surgeries.  She does not have a history of prior blood clots and does not take any blood thinning medication after being hospitalized in  after a GI bleed.  No fevers or chills, no abdominal pain, no nausea or vomiting.      MEDICAL DECISION MAKING / ASSESSMENT / PLAN     INITIAL VITALS: BP: (!) 171/94, Temp: 97.8 °F (36.6 °C), Pulse: 77, Respirations: 18, SpO2: 98 %    Marla Cortes is a 68 y.o. female who presents for evaluation of hemoptysis and hand pain.  Patient is

## 2024-06-10 NOTE — DISCHARGE INSTRUCTIONS
You were in the emergency department stay for coughing up blood    We did not find any signs of a blood clot in the lungs but we did find that you have a nodule in the left lobe of your lung that is concerning for cancer.  This can cause you to have blood when you cough.    You will need to follow up with your primary doctor for reevaluation.  They will need to have more tests done to determine if this nodule truly is cancer.    Please return to the emergency department for any severe chest pain or any new or concerning symptoms.

## 2024-10-12 ENCOUNTER — HOSPITAL ENCOUNTER (EMERGENCY)
Age: 69
Discharge: HOME | End: 2024-10-12
Attending: EMERGENCY MEDICINE
Payer: OTHER MISCELLANEOUS

## 2024-10-12 VITALS
HEIGHT: 63 IN | BODY MASS INDEX: 33.38 KG/M2 | HEART RATE: 85 BPM | WEIGHT: 188.4 LBS | TEMPERATURE: 98 F | RESPIRATION RATE: 16 BRPM | DIASTOLIC BLOOD PRESSURE: 79 MMHG | OXYGEN SATURATION: 100 % | SYSTOLIC BLOOD PRESSURE: 164 MMHG

## 2024-10-12 DIAGNOSIS — M54.6 THORACOLUMBAR BACK PAIN: ICD-10-CM
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
## 2024-10-12 DIAGNOSIS — M54.50 THORACOLUMBAR BACK PAIN: ICD-10-CM

## 2024-10-12 PROCEDURE — 99283 EMERGENCY DEPT VISIT LOW MDM: CPT

## 2024-10-12 RX ORDER — CYCLOBENZAPRINE HCL 10 MG
10 TABLET ORAL NIGHTLY PRN
Qty: 10 TABLET | Refills: 0 | Status: SHIPPED | OUTPATIENT
Start: 2024-10-12 | End: 2024-10-22

## 2024-10-12 ASSESSMENT — ENCOUNTER SYMPTOMS
NAUSEA: 0
ABDOMINAL PAIN: 0
ABDOMINAL DISTENTION: 0
COLOR CHANGE: 0
EYE PAIN: 0
RECTAL PAIN: 0
STRIDOR: 0
APNEA: 0
SHORTNESS OF BREATH: 0
SINUS PRESSURE: 0
VOICE CHANGE: 0
TROUBLE SWALLOWING: 0
PHOTOPHOBIA: 0
VOMITING: 0
BACK PAIN: 1
DIARRHEA: 0
WHEEZING: 0
RHINORRHEA: 0
CONSTIPATION: 0
CHEST TIGHTNESS: 0
BLOOD IN STOOL: 0
EYE REDNESS: 0
SORE THROAT: 0
EYE DISCHARGE: 0
COUGH: 0

## 2024-10-12 ASSESSMENT — PAIN DESCRIPTION - LOCATION: LOCATION: GENERALIZED

## 2024-10-12 ASSESSMENT — PAIN - FUNCTIONAL ASSESSMENT
PAIN_FUNCTIONAL_ASSESSMENT: 0-10
PAIN_FUNCTIONAL_ASSESSMENT: ACTIVITIES ARE NOT PREVENTED

## 2024-10-12 ASSESSMENT — PAIN SCALES - GENERAL: PAINLEVEL_OUTOF10: 8

## 2024-10-12 ASSESSMENT — PAIN DESCRIPTION - PAIN TYPE: TYPE: ACUTE PAIN

## 2024-10-12 ASSESSMENT — PAIN DESCRIPTION - DESCRIPTORS: DESCRIPTORS: ACHING
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
## 2025-06-10 ENCOUNTER — APPOINTMENT (OUTPATIENT)
Dept: CT IMAGING | Age: 70
End: 2025-06-10
Payer: MEDICARE

## 2025-06-10 ENCOUNTER — HOSPITAL ENCOUNTER (EMERGENCY)
Age: 70
Discharge: HOME | End: 2025-06-10
Attending: EMERGENCY MEDICINE
Payer: MEDICARE

## 2025-06-10 ENCOUNTER — APPOINTMENT (OUTPATIENT)
Dept: GENERAL RADIOLOGY | Age: 70
End: 2025-06-10
Payer: MEDICARE

## 2025-06-10 VITALS
BODY MASS INDEX: 33.61 KG/M2 | DIASTOLIC BLOOD PRESSURE: 61 MMHG | HEART RATE: 82 BPM | RESPIRATION RATE: 15 BRPM | WEIGHT: 189.7 LBS | HEIGHT: 63 IN | SYSTOLIC BLOOD PRESSURE: 124 MMHG | TEMPERATURE: 97.7 F | OXYGEN SATURATION: 100 %

## 2025-06-10 DIAGNOSIS — N30.00 ACUTE CYSTITIS WITHOUT HEMATURIA: Primary | ICD-10-CM

## 2025-06-10 PROBLEM — K76.0 HEPATIC STEATOSIS: Status: ACTIVE | Noted: 2024-09-09

## 2025-06-10 PROBLEM — F31.9 BIPOLAR 1 DISORDER (HCC): Status: ACTIVE | Noted: 2021-08-26

## 2025-06-10 PROBLEM — B39.2 PULMONARY HISTOPLASMOSIS: Status: ACTIVE | Noted: 2024-08-26

## 2025-06-10 PROBLEM — Z86.19 HEPATITIS C VIRUS INFECTION CURED AFTER ANTIVIRAL DRUG THERAPY: Status: ACTIVE | Noted: 2018-11-06

## 2025-06-10 LAB
ABO/RH: NORMAL
ALBUMIN SERPL-MCNC: 3.6 G/DL (ref 3.4–5)
ALBUMIN/GLOB SERPL: 1.2 {RATIO} (ref 1.1–2.2)
ALP SERPL-CCNC: 83 U/L (ref 40–129)
ALT SERPL-CCNC: 6 U/L (ref 10–40)
ANION GAP SERPL CALCULATED.3IONS-SCNC: 9 MMOL/L (ref 3–16)
ANTIBODY SCREEN: NORMAL
AST SERPL-CCNC: 27 U/L (ref 15–37)
BACTERIA URNS QL MICRO: ABNORMAL /HPF
BASE EXCESS BLDV CALC-SCNC: 1 MMOL/L (ref -2–3)
BASOPHILS # BLD: 0 K/UL (ref 0–0.2)
BASOPHILS NFR BLD: 0.4 %
BILIRUB SERPL-MCNC: 0.4 MG/DL (ref 0–1)
BILIRUB UR QL STRIP.AUTO: NEGATIVE
BUN SERPL-MCNC: 7 MG/DL (ref 7–20)
CALCIUM SERPL-MCNC: 8.8 MG/DL (ref 8.3–10.6)
CHLORIDE SERPL-SCNC: 101 MMOL/L (ref 99–110)
CLARITY UR: CLEAR
CO2 BLDV-SCNC: 30 MMOL/L
CO2 SERPL-SCNC: 26 MMOL/L (ref 21–32)
COHGB MFR BLDV: 4.4 % (ref 0–1.5)
COLOR UR: YELLOW
CREAT SERPL-MCNC: 0.9 MG/DL (ref 0.6–1.2)
DEPRECATED RDW RBC AUTO: 14 % (ref 12.4–15.4)
DO-HGB MFR BLDV: 64.8 %
EOSINOPHIL # BLD: 0.1 K/UL (ref 0–0.6)
EOSINOPHIL NFR BLD: 1.3 %
EPI CELLS #/AREA URNS HPF: ABNORMAL /HPF (ref 0–5)
ETHANOLAMINE SERPL-MCNC: NORMAL MG/DL (ref 0–0.08)
GFR SERPLBLD CREATININE-BSD FMLA CKD-EPI: 69 ML/MIN/{1.73_M2}
GLUCOSE SERPL-MCNC: 190 MG/DL (ref 70–99)
GLUCOSE UR STRIP.AUTO-MCNC: NEGATIVE MG/DL
HCO3 BLDV-SCNC: 28.6 MMOL/L (ref 24–28)
HCT VFR BLD AUTO: 34.6 % (ref 36–48)
HGB BLD-MCNC: 11.8 G/DL (ref 12–16)
HGB UR QL STRIP.AUTO: NEGATIVE
KETONES UR STRIP.AUTO-MCNC: NEGATIVE MG/DL
LACTATE BLDV-SCNC: 1.8 MMOL/L (ref 0.4–1.9)
LEUKOCYTE ESTERASE UR QL STRIP.AUTO: ABNORMAL
LIPASE SERPL-CCNC: 22 U/L (ref 13–60)
LYMPHOCYTES # BLD: 2.7 K/UL (ref 1–5.1)
LYMPHOCYTES NFR BLD: 31.6 %
MCH RBC QN AUTO: 30 PG (ref 26–34)
MCHC RBC AUTO-ENTMCNC: 34.2 G/DL (ref 31–36)
MCV RBC AUTO: 87.7 FL (ref 80–100)
METHGB MFR BLDV: 0.4 % (ref 0–1.5)
MONOCYTES # BLD: 0.5 K/UL (ref 0–1.3)
MONOCYTES NFR BLD: 5.9 %
NEUTROPHILS # BLD: 5.3 K/UL (ref 1.7–7.7)
NEUTROPHILS NFR BLD: 60.8 %
NITRITE UR QL STRIP.AUTO: POSITIVE
PCO2 BLDV: 58.7 MMHG (ref 41–51)
PH BLDV: 7.3 [PH] (ref 7.35–7.45)
PH UR STRIP.AUTO: 6.5 [PH] (ref 5–8)
PLATELET # BLD AUTO: 153 K/UL (ref 135–450)
PMV BLD AUTO: 9.4 FL (ref 5–10.5)
PO2 BLDV: <30 MMHG (ref 25–40)
POTASSIUM SERPL-SCNC: 3.8 MMOL/L (ref 3.5–5.1)
PROT SERPL-MCNC: 6.5 G/DL (ref 6.4–8.2)
PROT UR STRIP.AUTO-MCNC: NEGATIVE MG/DL
RBC # BLD AUTO: 3.95 M/UL (ref 4–5.2)
RBC #/AREA URNS HPF: ABNORMAL /HPF (ref 0–4)
SAO2 % BLDV: 32 %
SODIUM SERPL-SCNC: 136 MMOL/L (ref 136–145)
SP GR UR STRIP.AUTO: 1.01 (ref 1–1.03)
UA COMPLETE W REFLEX CULTURE PNL UR: YES
UA DIPSTICK W REFLEX MICRO PNL UR: YES
URN SPEC COLLECT METH UR: ABNORMAL
UROBILINOGEN UR STRIP-ACNC: 0.2 E.U./DL
WBC # BLD AUTO: 8.7 K/UL (ref 4–11)
WBC #/AREA URNS HPF: ABNORMAL /HPF (ref 0–5)

## 2025-06-10 PROCEDURE — 82803 BLOOD GASES ANY COMBINATION: CPT

## 2025-06-10 PROCEDURE — 87186 SC STD MICRODIL/AGAR DIL: CPT

## 2025-06-10 PROCEDURE — 2500000003 HC RX 250 WO HCPCS: Performed by: EMERGENCY MEDICINE

## 2025-06-10 PROCEDURE — 86901 BLOOD TYPING SEROLOGIC RH(D): CPT

## 2025-06-10 PROCEDURE — 86900 BLOOD TYPING SEROLOGIC ABO: CPT

## 2025-06-10 PROCEDURE — 85025 COMPLETE CBC W/AUTO DIFF WBC: CPT

## 2025-06-10 PROCEDURE — 87077 CULTURE AEROBIC IDENTIFY: CPT

## 2025-06-10 PROCEDURE — 74177 CT ABD & PELVIS W/CONTRAST: CPT

## 2025-06-10 PROCEDURE — 93005 ELECTROCARDIOGRAM TRACING: CPT | Performed by: EMERGENCY MEDICINE

## 2025-06-10 PROCEDURE — 96374 THER/PROPH/DIAG INJ IV PUSH: CPT

## 2025-06-10 PROCEDURE — 99285 EMERGENCY DEPT VISIT HI MDM: CPT

## 2025-06-10 PROCEDURE — 71045 X-RAY EXAM CHEST 1 VIEW: CPT

## 2025-06-10 PROCEDURE — 83605 ASSAY OF LACTIC ACID: CPT

## 2025-06-10 PROCEDURE — 80053 COMPREHEN METABOLIC PANEL: CPT

## 2025-06-10 PROCEDURE — 2580000003 HC RX 258: Performed by: EMERGENCY MEDICINE

## 2025-06-10 PROCEDURE — 86850 RBC ANTIBODY SCREEN: CPT

## 2025-06-10 PROCEDURE — 6360000004 HC RX CONTRAST MEDICATION: Performed by: EMERGENCY MEDICINE

## 2025-06-10 PROCEDURE — 83690 ASSAY OF LIPASE: CPT

## 2025-06-10 PROCEDURE — 82077 ASSAY SPEC XCP UR&BREATH IA: CPT

## 2025-06-10 PROCEDURE — 81001 URINALYSIS AUTO W/SCOPE: CPT

## 2025-06-10 PROCEDURE — 87086 URINE CULTURE/COLONY COUNT: CPT

## 2025-06-10 PROCEDURE — 96361 HYDRATE IV INFUSION ADD-ON: CPT

## 2025-06-10 PROCEDURE — 6360000002 HC RX W HCPCS: Performed by: EMERGENCY MEDICINE

## 2025-06-10 RX ORDER — CEFTRIAXONE 1 G/1
INJECTION, POWDER, FOR SOLUTION INTRAMUSCULAR; INTRAVENOUS
Status: DISCONTINUED
Start: 2025-06-10 | End: 2025-06-10 | Stop reason: HOSPADM

## 2025-06-10 RX ORDER — NOREPINEPHRINE BITARTRATE 1 MG/ML
INJECTION, SOLUTION INTRAVENOUS
Status: DISCONTINUED
Start: 2025-06-10 | End: 2025-06-10 | Stop reason: HOSPADM

## 2025-06-10 RX ORDER — IOPAMIDOL 755 MG/ML
75 INJECTION, SOLUTION INTRAVASCULAR
Status: COMPLETED | OUTPATIENT
Start: 2025-06-10 | End: 2025-06-10

## 2025-06-10 RX ORDER — SODIUM CHLORIDE, SODIUM LACTATE, POTASSIUM CHLORIDE, AND CALCIUM CHLORIDE .6; .31; .03; .02 G/100ML; G/100ML; G/100ML; G/100ML
1000 INJECTION, SOLUTION INTRAVENOUS ONCE
Status: COMPLETED | OUTPATIENT
Start: 2025-06-10 | End: 2025-06-10

## 2025-06-10 RX ORDER — WATER 10 ML/10ML
INJECTION INTRAMUSCULAR; INTRAVENOUS; SUBCUTANEOUS
Status: DISCONTINUED
Start: 2025-06-10 | End: 2025-06-10 | Stop reason: HOSPADM

## 2025-06-10 RX ORDER — CEPHALEXIN 500 MG/1
500 CAPSULE ORAL 3 TIMES DAILY
Qty: 15 CAPSULE | Refills: 0 | Status: SHIPPED | OUTPATIENT
Start: 2025-06-10 | End: 2025-06-15

## 2025-06-10 RX ADMIN — IOPAMIDOL 75 ML: 755 INJECTION, SOLUTION INTRAVENOUS at 09:52

## 2025-06-10 RX ADMIN — SODIUM CHLORIDE, SODIUM LACTATE, POTASSIUM CHLORIDE, AND CALCIUM CHLORIDE 1000 ML: .6; .31; .03; .02 INJECTION, SOLUTION INTRAVENOUS at 08:12

## 2025-06-10 RX ADMIN — WATER 1000 MG: 1 INJECTION INTRAMUSCULAR; INTRAVENOUS; SUBCUTANEOUS at 12:47

## 2025-06-10 ASSESSMENT — PAIN SCALES - GENERAL: PAINLEVEL_OUTOF10: 7

## 2025-06-10 ASSESSMENT — PAIN DESCRIPTION - ORIENTATION: ORIENTATION: MID

## 2025-06-10 ASSESSMENT — PAIN - FUNCTIONAL ASSESSMENT: PAIN_FUNCTIONAL_ASSESSMENT: 0-10

## 2025-06-10 ASSESSMENT — PAIN DESCRIPTION - LOCATION: LOCATION: ABDOMEN

## 2025-06-10 NOTE — ED NOTES
Patient prepared for and ready to be discharged. Patient discharged at this time in no acute distress after verbalizing understanding of discharge instructions. Patient left after receiving After Visit Summary instructions.        Mari Ellis, RN  06/10/25 [id number]

## 2025-06-10 NOTE — ED PROVIDER NOTES
[facility]  EMERGENCY DEPARTMENT ENCOUNTER          ATTENDING PHYSICIAN NOTE       Date of evaluation: 6/10/2025    Chief Complaint     Abdominal Pain (Pt presents to the ED from home via EMS d/t abdominal pain that started this morning. Pt was hypotensive upon EMS arrival. )      History of Present Illness     Marla Cortes is a 69 y.o. female who presents to the emergency department today due to abdominal pain.  Patient states that she has been having some belly pain since this morning.  Patient endorses drinking some alcohol last night woke up this morning with some severe lower abdominal pain she endorses nausea vomiting poor appetite over the last few days.  Endorses some dysuria.  Patient is noted to be hypotensive by EMS arrives with IV fluids infusing.    Review of Systems     Review of Systems  All systems were reviewed with the patient/family and negative except as otherwise documented in the HPI.      Past Medical, Surgical, Family, and Social History     She has a past medical history of Diabetes mellitus (HCC), Hypertension, and Stroke (HCC).  She has a past surgical history that includes Colonoscopy (N/A, 11/5/2022); Upper gastrointestinal endoscopy (N/A, 11/5/2022); and Upper gastrointestinal endoscopy (N/A, 11/5/2022).  Her family history is not on file.  She reports that she has been smoking cigarettes. She has a 30 pack-year smoking history. She has never used smokeless tobacco. She reports current alcohol use. She reports that she does not use drugs.    Medications     Discharge Medication List as of 6/10/2025 12:51 PM        CONTINUE these medications which have NOT CHANGED    Details   pantoprazole (PROTONIX) 40 MG tablet Take 1 tablet by mouth 2 times daily, Disp-30 tablet, R-0Normal      acetaminophen (TYLENOL) 500 MG tablet Take 1 tablet by mouth 4 times daily as needed for Pain, Disp-360 tablet, R-1Print      OLANZapine (ZYPREXA) 10 MG tablet Take 10 mg by mouth  22.0 13.0 - 60.0 U/L   Lactate, Sepsis   Result Value Ref Range    Lactic Acid, Sepsis 1.8 0.4 - 1.9 mmol/L   Blood Gas, Venous   Result Value Ref Range    pH, Junior 7.296 (L) 7.350 - 7.450    pCO2, Junior 58.7 (H) 41.0 - 51.0 mmHg    PO2, Junior <30.0 25.0 - 40.0 mmHg    HCO3, Venous 28.6 (H) 24.0 - 28.0 mmol/L    Base Excess, Junior 1.0 -2.0 - 3.0 mmol/L    O2 Sat, Junior 32 Not established %    Carboxyhemoglobin 4.4 (H) 0.0 - 1.5 %    MetHgb, Junior 0.4 0.0 - 1.5 %    TC02 (Calc), Junior 30 mmol/L    Hemoglobin, Junior, Reduced 64.80 %   Urinalysis with Reflex to Culture    Specimen: Urine   Result Value Ref Range    Color, UA Yellow Straw/Yellow    Clarity, UA Clear Clear    Glucose, Ur Negative Negative mg/dL    Bilirubin, Urine Negative Negative    Ketones, Urine Negative Negative mg/dL    Specific Gravity, UA 1.010 1.005 - 1.030    Blood, Urine Negative Negative    pH, Urine 6.5 5.0 - 8.0    Protein, UA Negative Negative mg/dL    Urobilinogen, Urine 0.2 <2.0 E.U./dL    Nitrite, Urine POSITIVE (A) Negative    Leukocyte Esterase, Urine SMALL (A) Negative    Microscopic Examination YES     Urine Type NotGiven     Urine Reflex to Culture Yes    ETOH   Result Value Ref Range    Ethanol Lvl None Detected mg/dL   Microscopic Urinalysis   Result Value Ref Range    WBC, UA 10-20 (A) 0 - 5 /HPF    RBC, UA 0-2 0 - 4 /HPF    Epithelial Cells, UA 0-1 0 - 5 /HPF    Bacteria, UA 4+ (A) None Seen /HPF   EKG 12 Lead   Result Value Ref Range    Ventricular Rate 67 BPM    Atrial Rate 67 BPM    P-R Interval 186 ms    QRS Duration 86 ms    Q-T Interval 422 ms    QTc Calculation (Bazett) 445 ms    P Axis 69 degrees    R Axis 38 degrees    T Axis 54 degrees    Diagnosis       Normal sinus rhythmNormal ECGEKG performed in ER and to be interpreted by ER physician.Reconfirmed by MD, ER ([id number]),  MIREILLE ZAVALA ([id number]) on 6/12/2025 12:12:44 PM   TYPE AND SCREEN   Result Value Ref Range    ABO/Rh O POS     Antibody Screen NEG            RECENT VITALS:  BP:

## 2025-06-12 LAB
BACTERIA UR CULT: ABNORMAL
EKG ATRIAL RATE: 67 BPM
EKG DIAGNOSIS: NORMAL
EKG P AXIS: 69 DEGREES
EKG P-R INTERVAL: 186 MS
EKG Q-T INTERVAL: 422 MS
EKG QRS DURATION: 86 MS
EKG QTC CALCULATION (BAZETT): 445 MS
EKG R AXIS: 38 DEGREES
EKG T AXIS: 54 DEGREES
EKG VENTRICULAR RATE: 67 BPM
ORGANISM: ABNORMAL

## 2025-06-12 NOTE — ED NOTES
[facility]   Emergency Department Culture Follow-Up       Marla Cortes (CSN: [id number]) was seen and evaluated at [facility] Emergency Department on 6/10/25 by Dr. Giang.    A urine culture was obtained at time of encounter and is now positive. Urine culture is growing  >100,000 CFU/mL E. coli. Sensitivity results: S-cefazolin     Results       Procedure Component Value Units Date/Time    Culture, Urine [[id number]]  (Abnormal)  (Susceptibility) Collected: 06/10/25 1136    Order Status: Completed Specimen: Urine, clean catch Updated: 06/12/25 0745     Organism Escherichia coli     Urine Culture, Routine >100,000 CFU/ml    Narrative:      ORDER#: [id number]                          ORDERED BY: LAVERNE GIANG  SOURCE: Urine Clean Catch                  COLLECTED:  06/10/25 11:36  ANTIBIOTICS AT CONI.:                      RECEIVED :  06/10/25 16:01    Susceptibility        Escherichia coli      BACTERIAL SUSCEPTIBILITY PANEL BY AIYANA      ampicillin <=2 mcg/mL Sensitive      ampicillin-sulbactam <=2 mcg/mL Sensitive      ceFAZolin <=1 mcg/mL Sensitive  [1]       cefepime <=0.12 mcg/mL Sensitive      cefTRIAXone <=0.25 mcg/mL Sensitive      ciprofloxacin <=0.06 mcg/mL Sensitive      ertapenem <=0.12 mcg/mL Sensitive      gentamicin <=1 mcg/mL Sensitive      levofloxacin <=0.12 mcg/mL Sensitive      meropenem <=0.25 mcg/mL Sensitive      nitrofurantoin <=16 mcg/mL Sensitive      piperacillin-tazobactam <=4 mcg/mL Sensitive      trimethoprim-sulfamethoxazole <=20 mcg/mL Sensitive                   [1]  NOTE: Cefazolin should only be used for uncomplicated UTI        for E.coli or Klebsiella pneumoniae.                            Treatment Course    Patient received appropriate empiric antibiotic therapy for organism isolated in culture (cephalexin 500 mg PO TID x 5 days).    Recommendation    It is recommended that the patient continue empiric antibiotic (cephalexin)

## 2025-08-08 ENCOUNTER — APPOINTMENT (OUTPATIENT)
Dept: GENERAL RADIOLOGY | Age: 70
End: 2025-08-08
Payer: MEDICARE

## 2025-08-08 ENCOUNTER — HOSPITAL ENCOUNTER (EMERGENCY)
Age: 70
Discharge: HOME | End: 2025-08-08
Attending: STUDENT IN AN ORGANIZED HEALTH CARE EDUCATION/TRAINING PROGRAM
Payer: MEDICARE

## 2025-08-08 VITALS
OXYGEN SATURATION: 98 % | HEART RATE: 74 BPM | RESPIRATION RATE: 16 BRPM | DIASTOLIC BLOOD PRESSURE: 85 MMHG | WEIGHT: 184.6 LBS | SYSTOLIC BLOOD PRESSURE: 184 MMHG | HEIGHT: 63 IN | TEMPERATURE: 98 F | BODY MASS INDEX: 32.71 KG/M2

## 2025-08-08 DIAGNOSIS — R04.2 HEMOPTYSIS: Primary | ICD-10-CM

## 2025-08-08 LAB
ANION GAP SERPL CALCULATED.3IONS-SCNC: 9 MMOL/L (ref 3–16)
BASOPHILS # BLD: 0 K/UL (ref 0–0.2)
BASOPHILS NFR BLD: 0.6 %
BUN SERPL-MCNC: 13 MG/DL (ref 7–20)
CALCIUM SERPL-MCNC: 10 MG/DL (ref 8.3–10.6)
CHLORIDE SERPL-SCNC: 102 MMOL/L (ref 99–110)
CO2 SERPL-SCNC: 28 MMOL/L (ref 21–32)
CREAT SERPL-MCNC: 0.9 MG/DL (ref 0.6–1.2)
D-DIMER QUANTITATIVE: 0.35 UG/ML FEU (ref 0–0.6)
DEPRECATED RDW RBC AUTO: 14.2 % (ref 12.4–15.4)
EOSINOPHIL # BLD: 0.2 K/UL (ref 0–0.6)
EOSINOPHIL NFR BLD: 2 %
GFR SERPLBLD CREATININE-BSD FMLA CKD-EPI: 69 ML/MIN/{1.73_M2}
GLUCOSE SERPL-MCNC: 171 MG/DL (ref 70–99)
HCT VFR BLD AUTO: 40.1 % (ref 36–48)
HGB BLD-MCNC: 13.6 G/DL (ref 12–16)
INR PPP: 0.98 (ref 0.86–1.14)
LYMPHOCYTES # BLD: 3 K/UL (ref 1–5.1)
LYMPHOCYTES NFR BLD: 35.8 %
MCH RBC QN AUTO: 29.3 PG (ref 26–34)
MCHC RBC AUTO-ENTMCNC: 33.8 G/DL (ref 31–36)
MCV RBC AUTO: 86.6 FL (ref 80–100)
MONOCYTES # BLD: 0.4 K/UL (ref 0–1.3)
MONOCYTES NFR BLD: 5.2 %
NEUTROPHILS # BLD: 4.8 K/UL (ref 1.7–7.7)
NEUTROPHILS NFR BLD: 56.4 %
PLATELET # BLD AUTO: 159 K/UL (ref 135–450)
PMV BLD AUTO: 8.5 FL (ref 5–10.5)
POTASSIUM SERPL-SCNC: 4.3 MMOL/L (ref 3.5–5.1)
PROTHROMBIN TIME: 13.3 SEC (ref 12.1–14.9)
RBC # BLD AUTO: 4.63 M/UL (ref 4–5.2)
SODIUM SERPL-SCNC: 139 MMOL/L (ref 136–145)
WBC # BLD AUTO: 8.4 K/UL (ref 4–11)

## 2025-08-08 PROCEDURE — 36415 COLL VENOUS BLD VENIPUNCTURE: CPT

## 2025-08-08 PROCEDURE — 85610 PROTHROMBIN TIME: CPT

## 2025-08-08 PROCEDURE — 99284 EMERGENCY DEPT VISIT MOD MDM: CPT

## 2025-08-08 PROCEDURE — 71046 X-RAY EXAM CHEST 2 VIEWS: CPT

## 2025-08-08 PROCEDURE — 85379 FIBRIN DEGRADATION QUANT: CPT

## 2025-08-08 PROCEDURE — 85025 COMPLETE CBC W/AUTO DIFF WBC: CPT

## 2025-08-08 PROCEDURE — 80048 BASIC METABOLIC PNL TOTAL CA: CPT

## 2025-08-08 ASSESSMENT — PAIN - FUNCTIONAL ASSESSMENT: PAIN_FUNCTIONAL_ASSESSMENT: NONE - DENIES PAIN

## 2025-08-08 ASSESSMENT — LIFESTYLE VARIABLES: HOW OFTEN DO YOU HAVE A DRINK CONTAINING ALCOHOL: NEVER

## (undated) DEVICE — MASK CAPNOGRAPHY AD W35IN DIA58IN SAMP LN L10FT O2 LN

## (undated) DEVICE — ERBE NESSY®PLATE 170 SPLIT; 168CM²; CABLE 3M: Brand: ERBE

## (undated) DEVICE — FORCEPS BX L240CM JAW DIA2.8MM L CAP W/ NDL MIC MESH TOOTH

## (undated) DEVICE — FIAPC® PROBE W/ FILTER 1500 A OD 1.5MM/4.5FR; L 1.5M/4.9FT: Brand: ERBE